# Patient Record
Sex: FEMALE | Race: BLACK OR AFRICAN AMERICAN
[De-identification: names, ages, dates, MRNs, and addresses within clinical notes are randomized per-mention and may not be internally consistent; named-entity substitution may affect disease eponyms.]

---

## 2017-09-18 ENCOUNTER — HOSPITAL ENCOUNTER (INPATIENT)
Dept: HOSPITAL 12 - ER | Age: 32
LOS: 2 days | Discharge: HOME | DRG: 314 | End: 2017-09-20
Attending: INTERNAL MEDICINE | Admitting: INTERNAL MEDICINE
Payer: COMMERCIAL

## 2017-09-18 VITALS — SYSTOLIC BLOOD PRESSURE: 164 MMHG | DIASTOLIC BLOOD PRESSURE: 104 MMHG

## 2017-09-18 VITALS — SYSTOLIC BLOOD PRESSURE: 151 MMHG | DIASTOLIC BLOOD PRESSURE: 88 MMHG

## 2017-09-18 VITALS — BODY MASS INDEX: 19.88 KG/M2 | WEIGHT: 108 LBS | HEIGHT: 62 IN

## 2017-09-18 DIAGNOSIS — I69.351: ICD-10-CM

## 2017-09-18 DIAGNOSIS — Z91.040: ICD-10-CM

## 2017-09-18 DIAGNOSIS — E28.2: ICD-10-CM

## 2017-09-18 DIAGNOSIS — Z88.8: ICD-10-CM

## 2017-09-18 DIAGNOSIS — E87.5: ICD-10-CM

## 2017-09-18 DIAGNOSIS — Z82.49: ICD-10-CM

## 2017-09-18 DIAGNOSIS — Z79.899: ICD-10-CM

## 2017-09-18 DIAGNOSIS — K58.9: ICD-10-CM

## 2017-09-18 DIAGNOSIS — G43.909: ICD-10-CM

## 2017-09-18 DIAGNOSIS — Z88.6: ICD-10-CM

## 2017-09-18 DIAGNOSIS — M32.14: ICD-10-CM

## 2017-09-18 DIAGNOSIS — N80.0: ICD-10-CM

## 2017-09-18 DIAGNOSIS — Z81.8: ICD-10-CM

## 2017-09-18 DIAGNOSIS — Z3A.00: ICD-10-CM

## 2017-09-18 DIAGNOSIS — O10.219: ICD-10-CM

## 2017-09-18 DIAGNOSIS — T82.41XA: Primary | ICD-10-CM

## 2017-09-18 DIAGNOSIS — F11.20: ICD-10-CM

## 2017-09-18 DIAGNOSIS — G89.4: ICD-10-CM

## 2017-09-18 DIAGNOSIS — Z91.15: ICD-10-CM

## 2017-09-18 DIAGNOSIS — G40.909: ICD-10-CM

## 2017-09-18 DIAGNOSIS — T82.838A: ICD-10-CM

## 2017-09-18 DIAGNOSIS — E87.79: ICD-10-CM

## 2017-09-18 DIAGNOSIS — Z99.2: ICD-10-CM

## 2017-09-18 DIAGNOSIS — M32.19: ICD-10-CM

## 2017-09-18 DIAGNOSIS — M79.7: ICD-10-CM

## 2017-09-18 DIAGNOSIS — D63.8: ICD-10-CM

## 2017-09-18 DIAGNOSIS — I12.0: ICD-10-CM

## 2017-09-18 DIAGNOSIS — N18.6: ICD-10-CM

## 2017-09-18 LAB
ALP SERPL-CCNC: 85 U/L (ref 50–136)
ALT SERPL W/O P-5'-P-CCNC: 30 U/L (ref 14–59)
AST SERPL-CCNC: 16 U/L (ref 15–37)
BASOPHILS # BLD AUTO: 0 K/UL (ref 0–8)
BASOPHILS NFR BLD AUTO: 0.7 % (ref 0–2)
BILIRUB DIRECT SERPL-MCNC: 0.2 MG/DL (ref 0–0.2)
BILIRUB SERPL-MCNC: 0.7 MG/DL (ref 0.2–1)
BUN SERPL-MCNC: 20 MG/DL (ref 7–18)
CHLORIDE SERPL-SCNC: 94 MMOL/L (ref 98–107)
CO2 SERPL-SCNC: 31 MMOL/L (ref 21–32)
CREAT SERPL-MCNC: 6.8 MG/DL (ref 0.6–1.3)
EOSINOPHIL # BLD AUTO: 0 K/UL (ref 0–0.7)
EOSINOPHIL NFR BLD AUTO: 1.3 % (ref 0–7)
GLUCOSE SERPL-MCNC: 96 MG/DL (ref 74–106)
HCT VFR BLD AUTO: 31.5 % (ref 37–47)
HGB BLD-MCNC: 10.1 G/DL (ref 12–16)
LIPASE SERPL-CCNC: 62 U/L (ref 73–393)
LYMPHOCYTES # BLD AUTO: 0.5 K/UL (ref 0.8–4.8)
LYMPHOCYTES NFR BLD AUTO: 15.4 % (ref 20.5–51.5)
MCH RBC QN AUTO: 30.6 UUG (ref 27–31)
MCHC RBC AUTO-ENTMCNC: 32 G/DL (ref 32–37)
MCV RBC AUTO: 95.8 FL (ref 81–99)
MONOCYTES # BLD AUTO: 0.2 K/UL (ref 0.1–1.3)
MONOCYTES NFR BLD AUTO: 5.6 % (ref 0–11)
NEUTROPHILS # BLD AUTO: 2.6 K/UL (ref 1.8–8.9)
NEUTROPHILS NFR BLD AUTO: 77 % (ref 38.5–71.5)
PLATELET # BLD AUTO: 134 K/UL (ref 150–450)
POTASSIUM SERPL-SCNC: 5.2 MMOL/L (ref 3.5–5.1)
RBC # BLD AUTO: 3.29 MIL/UL (ref 4.2–5.4)
WBC # BLD AUTO: 3.4 K/UL (ref 4–11.2)
WS STN SPEC: 8.6 G/DL (ref 6.4–8.2)

## 2017-09-18 PROCEDURE — 5A1D60Z: ICD-10-PCS | Performed by: INTERNAL MEDICINE

## 2017-09-18 PROCEDURE — A4663 DIALYSIS BLOOD PRESSURE CUFF: HCPCS

## 2017-09-18 NOTE — NUR
PT.WAS SEEN BY .PT. SEEMS TO HAVE SOME ANXIETY.NO S/S OF DISTRESS,HD 
SIDE

STILL UZING BLOOD,PRESSURE DRESSING REAPPLIED.

## 2017-09-18 NOTE — NUR
Telephone report given to annie Canales. vital signs stable. No bleeding noted at AV Shunt. 
patient AAOX4. able to use bedside commode.

## 2017-09-18 NOTE — NUR
PATIENT IN BED ALERT ORIENTED, NO COMPLAIN OF PAIN AT THIS TIME. NO FURTHER BLEEDING NOTED 
AT AV SHUNT ON L THIGH. CALL LIGHT WITHIN REACH. CONT TO MONITOR.

## 2017-09-18 NOTE — NUR
A call to Dr. Drummond report given and orders to transfer patient to telemetry status. vital 
signs stable. no signs of bleeding to left upper thigh.

## 2017-09-18 NOTE — NUR
Received this transfer from CCU, per wheelchair. Alert, oriented x 4, able to move all 
extremities on purpose. Left AV fistula with dressing, no bleeding noted. Saline to Right 
foot. Placed on tele SR 95.

## 2017-09-18 NOTE — NUR
At this time patient, in from ER. vital signs stable, no c/of pain. no n/v. left upper thigh 
with clamp in place no bleeding noted. Patient AAOx4 with vitals signs stable at this time. 

-------------------------------------------------------------------------------

Addendum: 09/18/17 at 1401 by YAN ROME RN

-------------------------------------------------------------------------------

Dr. Drummond called to be notified of pt's admission to ICU as [facility] overflow .

## 2017-09-19 VITALS — SYSTOLIC BLOOD PRESSURE: 146 MMHG | DIASTOLIC BLOOD PRESSURE: 88 MMHG

## 2017-09-19 VITALS — SYSTOLIC BLOOD PRESSURE: 146 MMHG | DIASTOLIC BLOOD PRESSURE: 95 MMHG

## 2017-09-19 VITALS — DIASTOLIC BLOOD PRESSURE: 107 MMHG | SYSTOLIC BLOOD PRESSURE: 156 MMHG

## 2017-09-19 VITALS — DIASTOLIC BLOOD PRESSURE: 106 MMHG | SYSTOLIC BLOOD PRESSURE: 146 MMHG

## 2017-09-19 VITALS — SYSTOLIC BLOOD PRESSURE: 165 MMHG | DIASTOLIC BLOOD PRESSURE: 106 MMHG

## 2017-09-19 LAB
ALP SERPL-CCNC: 76 U/L (ref 50–136)
ALT SERPL W/O P-5'-P-CCNC: 21 U/L (ref 14–59)
AST SERPL-CCNC: 14 U/L (ref 15–37)
BASOPHILS # BLD AUTO: 0 K/UL (ref 0–8)
BASOPHILS NFR BLD AUTO: 0.7 % (ref 0–2)
BILIRUB SERPL-MCNC: 0.5 MG/DL (ref 0.2–1)
BUN SERPL-MCNC: 29 MG/DL (ref 7–18)
CHLORIDE SERPL-SCNC: 94 MMOL/L (ref 98–107)
CHOLEST SERPL-MCNC: 132 MG/DL (ref ?–200)
CO2 SERPL-SCNC: 31 MMOL/L (ref 21–32)
CREAT SERPL-MCNC: 9.4 MG/DL (ref 0.6–1.3)
EOSINOPHIL # BLD AUTO: 0.1 K/UL (ref 0–0.7)
EOSINOPHIL NFR BLD AUTO: 2.1 % (ref 0–7)
GLUCOSE SERPL-MCNC: 74 MG/DL (ref 74–106)
HCT VFR BLD AUTO: 29 % (ref 37–47)
HDLC SERPL-MCNC: 46 MG/DL (ref 40–60)
HGB BLD-MCNC: 9.3 G/DL (ref 12–16)
LYMPHOCYTES # BLD AUTO: 1.1 K/UL (ref 0.8–4.8)
LYMPHOCYTES NFR BLD AUTO: 26.1 % (ref 20.5–51.5)
MAGNESIUM SERPL-MCNC: 2 MG/DL (ref 1.8–2.4)
MCH RBC QN AUTO: 31.4 UUG (ref 27–31)
MCHC RBC AUTO-ENTMCNC: 32 G/DL (ref 32–37)
MCV RBC AUTO: 97.1 FL (ref 81–99)
MONOCYTES # BLD AUTO: 0.3 K/UL (ref 0.1–1.3)
MONOCYTES NFR BLD AUTO: 7 % (ref 0–11)
NEUTROPHILS # BLD AUTO: 2.8 K/UL (ref 1.8–8.9)
NEUTROPHILS NFR BLD AUTO: 64.1 % (ref 38.5–71.5)
PHOSPHATE SERPL-MCNC: 7.1 MG/DL (ref 2.5–4.9)
PLATELET # BLD AUTO: 129 K/UL (ref 150–450)
POTASSIUM SERPL-SCNC: 5.4 MMOL/L (ref 3.5–5.1)
RBC # BLD AUTO: 2.96 MIL/UL (ref 4.2–5.4)
TRIGL SERPL-MCNC: 90 MG/DL (ref 30–150)
TSH SERPL DL<=0.005 MIU/L-ACNC: 3.02 MIU/ML (ref 0.36–3.74)
WBC # BLD AUTO: 4.3 K/UL (ref 4–11.2)
WS STN SPEC: 8.6 G/DL (ref 6.4–8.2)

## 2017-09-19 NOTE — NUR
PATIENT SEEN AND EXAMINED BY DR GRAHAM AND HE STATED THAT PATIENT WILL HAVE DIALYSIS TODAY 
AND HE IS AWARE OF THE CREATINE OF 9.4

## 2017-09-19 NOTE — NUR
RESTING COMFORTABLE REQUESTED AND RECEIVED MEDICATIONS FOR PAIN AND ITCHING AS ORDERED MADE 
COMFORTABLE.

## 2017-09-19 NOTE — NUR
PATIENT VOMITED ABOUT 150ML OF PARTLY UNDIGESTED FOOD UNABLE TO GIVE HER THE DUE HYDRALAZINE 
ZOFRAN GIVEN AS ORDERED AND WILL OBSERVE.

## 2017-09-19 NOTE — NUR
PATIENT BP ELEVATED 167/115 HR 97, DR. ARAGON ORDER METOPROLOL 50 MG PLUS TYLENOL 650MG 
PO, BUT PATIENT REFUSED BOTH MEDICATIONS. CONT TO MONITOR.

## 2017-09-19 NOTE — NUR
WHILE I WAS IN THE ROOM GIVING PATIENT HER BENADRYL AND DILAUDID THE DIALYSIS RN CAME IN 
MOVED THE COMPUTER AND THE BED TABLE AND PATIENT GOT UPSET AND TOLD HER THAT SHE WAS RUDE 
BECAUSE SHE WAS TALKING AND THAT SHE DID NOT NEED TO PUSH THINGS AROUND LIKE THAT AND 
FINALLY AFTER ALL SAID AND DONE THE PATIENT TOLD THE DIALYSIS RN THAT SHE DOES NOT WANT HER 
TO DO HER DIALYSIS SO THE DIALYSIS NURSE LEFT.

## 2017-09-20 VITALS — SYSTOLIC BLOOD PRESSURE: 121 MMHG | DIASTOLIC BLOOD PRESSURE: 80 MMHG

## 2017-09-20 VITALS — SYSTOLIC BLOOD PRESSURE: 117 MMHG | DIASTOLIC BLOOD PRESSURE: 84 MMHG

## 2017-09-20 VITALS — DIASTOLIC BLOOD PRESSURE: 90 MMHG | SYSTOLIC BLOOD PRESSURE: 152 MMHG

## 2017-09-20 NOTE — NUR
EAT LUNCH MOD AMT D/C INSTRUCTION GIVEN REGARDING F/U WITH VASCULAR MD /PMD CONTINUE HOME 
MEDICINE AS ORDER REFUSED PHAMACY TO INSTRUCTION ON HOME MEDICINE     AND EDUCATION PK GIVEN 
,UNDERSTAND AND SIGNS D/C SHEET

## 2017-09-20 NOTE — NUR
AWAKE ALERT COOPERATE WELL NO SOB OR PAIN EAT BREAKFAST WELL   HL INPLACE ON LT UPPER ARM 
AND CALL LIGHT WITHIN REACH

## 2017-09-20 NOTE — NUR
The patient will be discharged today back home [[id number], [place] [address]] 
per Dr. Falcon.  She is aware and in agreement.  She confirmed her address and will try 
to arrange for her transportation.  Her RN, Nigel, is aware of her discharge plan.

## 2017-09-20 NOTE — NUR
PATIENT SLEPT INTERMITTENTLY, IN NO ACUTE DISTRESS, NO SOB, NO C/O OF CHEST PAIN, NO S/S OF 
BLEEDING. PAIN MANAGEMENT AS ORDERED. PATIENT IS ON TELE SINUS RHYTHM. CALL LIGHT WITHIN 
REACH, WILL CONTINUE TO MONITOR.

## 2017-09-20 NOTE — NUR
IV HL D/C PRIOR D/C HOME TODAY CONDITION STABLE PAIN AND N/V UNDER CONTROL NO BLEEDING FROM 
AV SHUNT SITE DSG D/I

## 2017-10-11 ENCOUNTER — HOSPITAL ENCOUNTER (INPATIENT)
Dept: HOSPITAL 12 - ER | Age: 32
LOS: 2 days | Discharge: HOME | DRG: 682 | End: 2017-10-13
Attending: INTERNAL MEDICINE | Admitting: INTERNAL MEDICINE
Payer: COMMERCIAL

## 2017-10-11 VITALS — DIASTOLIC BLOOD PRESSURE: 120 MMHG | SYSTOLIC BLOOD PRESSURE: 197 MMHG

## 2017-10-11 VITALS — SYSTOLIC BLOOD PRESSURE: 143 MMHG | DIASTOLIC BLOOD PRESSURE: 88 MMHG

## 2017-10-11 VITALS — SYSTOLIC BLOOD PRESSURE: 149 MMHG | DIASTOLIC BLOOD PRESSURE: 106 MMHG

## 2017-10-11 VITALS — HEIGHT: 62 IN | BODY MASS INDEX: 18.95 KG/M2 | WEIGHT: 103 LBS

## 2017-10-11 DIAGNOSIS — Z91.040: ICD-10-CM

## 2017-10-11 DIAGNOSIS — I69.354: ICD-10-CM

## 2017-10-11 DIAGNOSIS — N18.6: ICD-10-CM

## 2017-10-11 DIAGNOSIS — R10.2: ICD-10-CM

## 2017-10-11 DIAGNOSIS — G89.4: ICD-10-CM

## 2017-10-11 DIAGNOSIS — G43.909: ICD-10-CM

## 2017-10-11 DIAGNOSIS — K21.9: ICD-10-CM

## 2017-10-11 DIAGNOSIS — N92.6: ICD-10-CM

## 2017-10-11 DIAGNOSIS — Z91.018: ICD-10-CM

## 2017-10-11 DIAGNOSIS — Z91.15: ICD-10-CM

## 2017-10-11 DIAGNOSIS — N93.8: ICD-10-CM

## 2017-10-11 DIAGNOSIS — K58.1: ICD-10-CM

## 2017-10-11 DIAGNOSIS — Z88.6: ICD-10-CM

## 2017-10-11 DIAGNOSIS — H26.9: ICD-10-CM

## 2017-10-11 DIAGNOSIS — Z79.899: ICD-10-CM

## 2017-10-11 DIAGNOSIS — M32.14: ICD-10-CM

## 2017-10-11 DIAGNOSIS — I12.0: Primary | ICD-10-CM

## 2017-10-11 DIAGNOSIS — D63.1: ICD-10-CM

## 2017-10-11 DIAGNOSIS — Z81.8: ICD-10-CM

## 2017-10-11 DIAGNOSIS — E87.5: ICD-10-CM

## 2017-10-11 DIAGNOSIS — G40.909: ICD-10-CM

## 2017-10-11 DIAGNOSIS — Z79.52: ICD-10-CM

## 2017-10-11 DIAGNOSIS — Z82.49: ICD-10-CM

## 2017-10-11 DIAGNOSIS — F12.90: ICD-10-CM

## 2017-10-11 DIAGNOSIS — M79.7: ICD-10-CM

## 2017-10-11 DIAGNOSIS — Z99.2: ICD-10-CM

## 2017-10-11 DIAGNOSIS — M14.80: ICD-10-CM

## 2017-10-11 LAB
ALP SERPL-CCNC: 72 U/L (ref 50–136)
ALT SERPL W/O P-5'-P-CCNC: 27 U/L (ref 14–59)
APPEARANCE UR: (no result)
AST SERPL-CCNC: 25 U/L (ref 15–37)
BASOPHILS # BLD AUTO: 0 K/UL (ref 0–8)
BASOPHILS NFR BLD AUTO: 0.9 % (ref 0–2)
BILIRUB DIRECT SERPL-MCNC: 0.1 MG/DL (ref 0–0.2)
BILIRUB SERPL-MCNC: 0.3 MG/DL (ref 0.2–1)
BILIRUB UR QL STRIP: NEGATIVE
BUN SERPL-MCNC: 88 MG/DL (ref 7–18)
CHLORIDE SERPL-SCNC: 104 MMOL/L (ref 98–107)
CO2 SERPL-SCNC: 19 MMOL/L (ref 21–32)
COLOR UR: (no result)
CREAT SERPL-MCNC: 16 MG/DL (ref 0.6–1.3)
DEPRECATED SQUAMOUS URNS QL MICRO: (no result) /HPF
EOSINOPHIL # BLD AUTO: 0.1 K/UL (ref 0–0.7)
EOSINOPHIL NFR BLD AUTO: 3.2 % (ref 0–7)
GLUCOSE SERPL-MCNC: 102 MG/DL (ref 74–106)
GLUCOSE UR STRIP-MCNC: (no result) MG/DL
HCT VFR BLD AUTO: 26 % (ref 31.2–41.9)
HGB BLD-MCNC: 8.6 G/DL (ref 10.9–14.3)
HGB UR QL STRIP: (no result)
KETONES UR STRIP-MCNC: NEGATIVE MG/DL
LEUKOCYTE ESTERASE UR QL STRIP: NEGATIVE
LYMPHOCYTES # BLD AUTO: 0.7 K/UL (ref 20–40)
LYMPHOCYTES NFR BLD AUTO: 19.8 % (ref 20.5–51.5)
MCH RBC QN AUTO: 32.5 UUG (ref 24.7–32.8)
MCHC RBC AUTO-ENTMCNC: 33 G/DL (ref 32.3–35.6)
MCV RBC AUTO: 98.8 FL (ref 75.5–95.3)
MONOCYTES # BLD AUTO: 0.3 K/UL (ref 2–10)
MONOCYTES NFR BLD AUTO: 7.1 % (ref 0–11)
NEUTROPHILS # BLD AUTO: 2.5 K/UL (ref 1.8–8.9)
NEUTROPHILS NFR BLD AUTO: 69 % (ref 38.5–71.5)
NITRITE UR QL STRIP: NEGATIVE
PH UR STRIP: 8.5 [PH] (ref 5–8)
PLATELET # BLD AUTO: 128 K/UL (ref 179–408)
POTASSIUM SERPL-SCNC: 8.7 MMOL/L (ref 3.5–5.1)
PROT UR QL STRIP: (no result)
RBC # BLD AUTO: 2.64 MIL/UL (ref 3.63–4.92)
RBC #/AREA URNS HPF: (no result) /HPF (ref 0–3)
SP GR UR STRIP: 1.01 (ref 1–1.03)
UROBILINOGEN UR STRIP-MCNC: 0.2 E.U./DL
WBC # BLD AUTO: 3.6 K/UL (ref 3.8–11.8)
WBC #/AREA URNS HPF: (no result) /HPF
WBC #/AREA URNS HPF: (no result) /HPF (ref 0–3)
WS STN SPEC: 7.6 G/DL (ref 6.4–8.2)

## 2017-10-11 PROCEDURE — 5A1D70Z PERFORMANCE OF URINARY FILTRATION, INTERMITTENT, LESS THAN 6 HOURS PER DAY: ICD-10-PCS

## 2017-10-11 PROCEDURE — A4663 DIALYSIS BLOOD PRESSURE CUFF: HCPCS

## 2017-10-11 RX ADMIN — BENAZEPRIL HYDROCHLORIDE SCH MG: 20 TABLET, FILM COATED ORAL at 15:42

## 2017-10-11 RX ADMIN — RENAGEL SCH MG: 800 TABLET ORAL at 15:41

## 2017-10-11 RX ADMIN — LEVETIRACETAM SCH MG: 500 TABLET, FILM COATED ORAL at 17:00

## 2017-10-11 RX ADMIN — CARVEDILOL SCH MG: 25 TABLET, FILM COATED ORAL at 15:35

## 2017-10-11 RX ADMIN — RENAGEL SCH MG: 800 TABLET ORAL at 18:00

## 2017-10-11 RX ADMIN — DIPHENHYDRAMINE HYDROCHLORIDE PRN MG: 50 INJECTION INTRAMUSCULAR; INTRAVENOUS at 21:06

## 2017-10-11 RX ADMIN — CARVEDILOL SCH MG: 25 TABLET, FILM COATED ORAL at 17:00

## 2017-10-11 RX ADMIN — DIPHENHYDRAMINE HYDROCHLORIDE PRN MG: 50 INJECTION INTRAMUSCULAR; INTRAVENOUS at 13:21

## 2017-10-11 RX ADMIN — LEVETIRACETAM SCH MG: 500 TABLET, FILM COATED ORAL at 15:34

## 2017-10-11 RX ADMIN — HYDROMORPHONE HYDROCHLORIDE PRN MG: 2 INJECTION, SOLUTION INTRAMUSCULAR; INTRAVENOUS; SUBCUTANEOUS at 21:06

## 2017-10-11 RX ADMIN — DOXAZOSIN MESYLATE SCH MG: 1 TABLET ORAL at 15:36

## 2017-10-11 RX ADMIN — AMLODIPINE BESYLATE SCH MG: 10 TABLET ORAL at 15:35

## 2017-10-11 RX ADMIN — HYDROMORPHONE HYDROCHLORIDE PRN MG: 2 INJECTION, SOLUTION INTRAMUSCULAR; INTRAVENOUS; SUBCUTANEOUS at 17:07

## 2017-10-11 RX ADMIN — HYDROMORPHONE HYDROCHLORIDE PRN MG: 2 INJECTION, SOLUTION INTRAMUSCULAR; INTRAVENOUS; SUBCUTANEOUS at 13:21

## 2017-10-11 RX ADMIN — DIPHENHYDRAMINE HYDROCHLORIDE PRN MG: 50 INJECTION INTRAMUSCULAR; INTRAVENOUS at 17:07

## 2017-10-11 RX ADMIN — DOCUSATE SODIUM SCH MG: 100 CAPSULE, LIQUID FILLED ORAL at 21:00

## 2017-10-11 RX ADMIN — CINACALCET HYDROCHLORIDE SCH MG: 30 TABLET, COATED ORAL at 15:35

## 2017-10-11 RX ADMIN — DOXAZOSIN MESYLATE SCH MG: 1 TABLET ORAL at 17:00

## 2017-10-11 RX ADMIN — BENAZEPRIL HYDROCHLORIDE SCH MG: 20 TABLET, FILM COATED ORAL at 17:00

## 2017-10-11 NOTE — NUR
dialysis completed, took out 2.5 liters of fluid, no distress noted, will serve lunch and 
give medications, tele SR 90's, needs attended

## 2017-10-11 NOTE — NUR
resting in bed, medicated x2 with Dilaudid 1.5mg iv prn for pain this shift, all needs 
attended and met, still has vaginal bleeding, changed peripad x 3 this shift, call light 
within reach

## 2017-10-11 NOTE — NUR
PT RECEIVED IN BED, AWAKE. A/OX4. ABLE TO MAKE NEEDS KNOWN. V/S STABLE. IN NO ACUTE 
DISTRESS. C/O ABDOMINAL PAIN AND HEADACHE 8/10. IV INTACT AND PATENT. AV SHUNT IN LEFT 
THIGH, THRILL FELT. SAFETY MEASURE IMPLEMENTED. CALL LIGHT WITHIN REACH.

## 2017-10-11 NOTE — NUR
received from ER awake alert and oriented per stretcher with c./o vaginal bleeding and lower 
abdominal and back pain, states missed her dialysis. av shunt on the left thigh, routine 
admission care rendered, initial assessment done, spoke to dialysis nurse- will do her 
today, safety measures initiated, saline lock on the left lower leg above the ankle. call 
light within reach.

## 2017-10-11 NOTE — NUR
dr. teran admitted the pt. transfer to floor pending on the bed being 
available. nsg supervisor aware.

## 2017-10-12 VITALS — DIASTOLIC BLOOD PRESSURE: 95 MMHG | SYSTOLIC BLOOD PRESSURE: 136 MMHG

## 2017-10-12 VITALS — SYSTOLIC BLOOD PRESSURE: 138 MMHG | DIASTOLIC BLOOD PRESSURE: 76 MMHG

## 2017-10-12 VITALS — DIASTOLIC BLOOD PRESSURE: 87 MMHG | SYSTOLIC BLOOD PRESSURE: 139 MMHG

## 2017-10-12 LAB
ALP SERPL-CCNC: 65 U/L (ref 50–136)
ALT SERPL W/O P-5'-P-CCNC: 19 U/L (ref 14–59)
AST SERPL-CCNC: 16 U/L (ref 15–37)
BASOPHILS # BLD AUTO: 0 K/UL (ref 0–8)
BASOPHILS NFR BLD AUTO: 0.9 % (ref 0–2)
BILIRUB SERPL-MCNC: 0.4 MG/DL (ref 0.2–1)
BUN SERPL-MCNC: 49 MG/DL (ref 7–18)
CHLORIDE SERPL-SCNC: 99 MMOL/L (ref 98–107)
CO2 SERPL-SCNC: 30 MMOL/L (ref 21–32)
CREAT SERPL-MCNC: 11.5 MG/DL (ref 0.6–1.3)
EOSINOPHIL # BLD AUTO: 0.2 K/UL (ref 0–0.7)
EOSINOPHIL NFR BLD AUTO: 4.3 % (ref 0–7)
EOSINOPHIL NFR BLD MANUAL: 6 % (ref 0–8)
GLUCOSE SERPL-MCNC: 92 MG/DL (ref 74–106)
HCT VFR BLD AUTO: 29.5 % (ref 31.2–41.9)
HGB BLD-MCNC: 9.6 G/DL (ref 10.9–14.3)
LYMPHOCYTES # BLD AUTO: 1 K/UL (ref 20–40)
LYMPHOCYTES NFR BLD AUTO: 29.5 % (ref 20.5–51.5)
LYMPHOCYTES NFR BLD MANUAL: 38 % (ref 20–40)
MAGNESIUM SERPL-MCNC: 2.2 MG/DL (ref 1.8–2.4)
MCH RBC QN AUTO: 31.9 UUG (ref 24.7–32.8)
MCHC RBC AUTO-ENTMCNC: 32 G/DL (ref 32.3–35.6)
MCV RBC AUTO: 98.3 FL (ref 75.5–95.3)
MONOCYTES # BLD AUTO: 0.3 K/UL (ref 2–10)
MONOCYTES NFR BLD AUTO: 9.1 % (ref 0–11)
MONOCYTES NFR BLD MANUAL: 6 % (ref 2–10)
NEUTROPHILS # BLD AUTO: 2 K/UL (ref 1.8–8.9)
NEUTROPHILS NFR BLD AUTO: 56.2 % (ref 38.5–71.5)
NEUTS SEG NFR BLD MANUAL: 50 % (ref 42–75)
PHOSPHATE SERPL-MCNC: 8.3 MG/DL (ref 2.5–4.9)
PLATELET # BLD AUTO: 61 K/UL (ref 179–408)
POTASSIUM SERPL-SCNC: 5.2 MMOL/L (ref 3.5–5.1)
RBC # BLD AUTO: 3 MIL/UL (ref 3.63–4.92)
WBC # BLD AUTO: 3.5 K/UL (ref 3.8–11.8)
WS STN SPEC: 8 G/DL (ref 6.4–8.2)

## 2017-10-12 RX ADMIN — HYDROMORPHONE HYDROCHLORIDE PRN MG: 2 INJECTION, SOLUTION INTRAMUSCULAR; INTRAVENOUS; SUBCUTANEOUS at 05:34

## 2017-10-12 RX ADMIN — HYDROMORPHONE HYDROCHLORIDE PRN MG: 2 INJECTION, SOLUTION INTRAMUSCULAR; INTRAVENOUS; SUBCUTANEOUS at 21:59

## 2017-10-12 RX ADMIN — RENAGEL SCH MG: 800 TABLET ORAL at 08:40

## 2017-10-12 RX ADMIN — HYDROMORPHONE HYDROCHLORIDE PRN MG: 2 INJECTION, SOLUTION INTRAMUSCULAR; INTRAVENOUS; SUBCUTANEOUS at 17:56

## 2017-10-12 RX ADMIN — CARVEDILOL SCH MG: 25 TABLET, FILM COATED ORAL at 16:59

## 2017-10-12 RX ADMIN — LEVETIRACETAM SCH MG: 500 TABLET, FILM COATED ORAL at 08:40

## 2017-10-12 RX ADMIN — HYDROMORPHONE HYDROCHLORIDE PRN MG: 2 INJECTION, SOLUTION INTRAMUSCULAR; INTRAVENOUS; SUBCUTANEOUS at 13:35

## 2017-10-12 RX ADMIN — DOXAZOSIN MESYLATE SCH MG: 1 TABLET ORAL at 17:00

## 2017-10-12 RX ADMIN — CARVEDILOL SCH MG: 25 TABLET, FILM COATED ORAL at 17:00

## 2017-10-12 RX ADMIN — CARVEDILOL SCH MG: 25 TABLET, FILM COATED ORAL at 08:41

## 2017-10-12 RX ADMIN — LEVETIRACETAM SCH MG: 500 TABLET, FILM COATED ORAL at 16:59

## 2017-10-12 RX ADMIN — DIPHENHYDRAMINE HYDROCHLORIDE PRN MG: 50 INJECTION INTRAMUSCULAR; INTRAVENOUS at 13:42

## 2017-10-12 RX ADMIN — AMLODIPINE BESYLATE SCH MG: 10 TABLET ORAL at 08:41

## 2017-10-12 RX ADMIN — RENAGEL SCH MG: 800 TABLET ORAL at 16:59

## 2017-10-12 RX ADMIN — HYDROMORPHONE HYDROCHLORIDE PRN MG: 2 INJECTION, SOLUTION INTRAMUSCULAR; INTRAVENOUS; SUBCUTANEOUS at 09:55

## 2017-10-12 RX ADMIN — DIPHENHYDRAMINE HYDROCHLORIDE PRN MG: 50 INJECTION INTRAMUSCULAR; INTRAVENOUS at 05:34

## 2017-10-12 RX ADMIN — BENAZEPRIL HYDROCHLORIDE SCH MG: 20 TABLET, FILM COATED ORAL at 08:41

## 2017-10-12 RX ADMIN — BENAZEPRIL HYDROCHLORIDE SCH MG: 20 TABLET, FILM COATED ORAL at 16:59

## 2017-10-12 RX ADMIN — CINACALCET HYDROCHLORIDE SCH MG: 30 TABLET, COATED ORAL at 08:40

## 2017-10-12 RX ADMIN — FAMOTIDINE SCH MG: 10 INJECTION INTRAVENOUS at 08:40

## 2017-10-12 RX ADMIN — RENAGEL SCH MG: 800 TABLET ORAL at 12:41

## 2017-10-12 RX ADMIN — DIPHENHYDRAMINE HYDROCHLORIDE PRN MG: 50 INJECTION INTRAMUSCULAR; INTRAVENOUS at 09:54

## 2017-10-12 RX ADMIN — DOCUSATE SODIUM SCH MG: 100 CAPSULE, LIQUID FILLED ORAL at 21:00

## 2017-10-12 RX ADMIN — DIPHENHYDRAMINE HYDROCHLORIDE PRN MG: 50 INJECTION INTRAMUSCULAR; INTRAVENOUS at 21:59

## 2017-10-12 RX ADMIN — HYDROMORPHONE HYDROCHLORIDE PRN MG: 2 INJECTION, SOLUTION INTRAMUSCULAR; INTRAVENOUS; SUBCUTANEOUS at 01:07

## 2017-10-12 RX ADMIN — BENAZEPRIL HYDROCHLORIDE SCH MG: 20 TABLET, FILM COATED ORAL at 17:00

## 2017-10-12 RX ADMIN — DOXAZOSIN MESYLATE SCH MG: 1 TABLET ORAL at 08:41

## 2017-10-12 RX ADMIN — Medication SCH TAB: at 08:40

## 2017-10-12 RX ADMIN — DIPHENHYDRAMINE HYDROCHLORIDE PRN MG: 50 INJECTION INTRAMUSCULAR; INTRAVENOUS at 01:07

## 2017-10-12 RX ADMIN — DIPHENHYDRAMINE HYDROCHLORIDE PRN MG: 50 INJECTION INTRAMUSCULAR; INTRAVENOUS at 17:56

## 2017-10-12 NOTE — NUR
END OF SHIFT NOTES. PT SLEPT INTERMITTENTLY THROUGHOUT SHIFT. IN STABLE CONDITION. PAIN 
MANAGED. IV/INTACT PATENT. ALL NEEDS ATTENDED. SAFETY MAINTAINED. CALL LIGHT WITHIN REACH.

## 2017-10-12 NOTE — NUR
PT'S ON BED REST AFTER SHE GOT MEDICATION VIA IV SITE ON LEFT FOOT,PT STATED THAT"IT'S 
HURTING ME A LOT EVEN THOUGH MY VEIN'S STILL GOOD ONE.I DON'T LIKE IT ON MY FOOT",RESTARTED 
THE NEW HL AT LEFT FOREARM AND REMOVED THE OLD ONE FROM LEFT FOOT:PER PT REQUESTED.UPDATED 
THE PLAN OF CARE TO PT.PER PT STATED THAT" I HAVE PERIOD,STILL A LITTLE NOT REALLY A LOT 
LIKE BEFORE".KEPT COMFORT.CALL-LIGHT WITHIN REACH.

## 2017-10-13 VITALS — DIASTOLIC BLOOD PRESSURE: 89 MMHG | SYSTOLIC BLOOD PRESSURE: 132 MMHG

## 2017-10-13 VITALS — DIASTOLIC BLOOD PRESSURE: 92 MMHG | SYSTOLIC BLOOD PRESSURE: 147 MMHG

## 2017-10-13 VITALS — SYSTOLIC BLOOD PRESSURE: 124 MMHG | DIASTOLIC BLOOD PRESSURE: 83 MMHG

## 2017-10-13 VITALS — DIASTOLIC BLOOD PRESSURE: 92 MMHG | SYSTOLIC BLOOD PRESSURE: 150 MMHG

## 2017-10-13 RX ADMIN — CARVEDILOL SCH MG: 25 TABLET, FILM COATED ORAL at 09:14

## 2017-10-13 RX ADMIN — RENAGEL SCH MG: 800 TABLET ORAL at 17:16

## 2017-10-13 RX ADMIN — AMLODIPINE BESYLATE SCH MG: 10 TABLET ORAL at 09:13

## 2017-10-13 RX ADMIN — HYDROMORPHONE HYDROCHLORIDE PRN MG: 2 INJECTION, SOLUTION INTRAMUSCULAR; INTRAVENOUS; SUBCUTANEOUS at 14:23

## 2017-10-13 RX ADMIN — DIPHENHYDRAMINE HYDROCHLORIDE PRN MG: 50 INJECTION INTRAMUSCULAR; INTRAVENOUS at 06:01

## 2017-10-13 RX ADMIN — DOXAZOSIN MESYLATE SCH MG: 1 TABLET ORAL at 17:20

## 2017-10-13 RX ADMIN — Medication SCH TAB: at 09:13

## 2017-10-13 RX ADMIN — LEVETIRACETAM SCH MG: 500 TABLET, FILM COATED ORAL at 09:15

## 2017-10-13 RX ADMIN — DIPHENHYDRAMINE HYDROCHLORIDE PRN MG: 50 INJECTION INTRAMUSCULAR; INTRAVENOUS at 10:22

## 2017-10-13 RX ADMIN — BENAZEPRIL HYDROCHLORIDE SCH MG: 20 TABLET, FILM COATED ORAL at 17:20

## 2017-10-13 RX ADMIN — DIPHENHYDRAMINE HYDROCHLORIDE PRN MG: 50 INJECTION INTRAMUSCULAR; INTRAVENOUS at 02:07

## 2017-10-13 RX ADMIN — RENAGEL SCH MG: 800 TABLET ORAL at 12:22

## 2017-10-13 RX ADMIN — RENAGEL SCH MG: 800 TABLET ORAL at 09:13

## 2017-10-13 RX ADMIN — HYDROMORPHONE HYDROCHLORIDE PRN MG: 2 INJECTION, SOLUTION INTRAMUSCULAR; INTRAVENOUS; SUBCUTANEOUS at 06:01

## 2017-10-13 RX ADMIN — CINACALCET HYDROCHLORIDE SCH MG: 30 TABLET, COATED ORAL at 09:15

## 2017-10-13 RX ADMIN — CARVEDILOL SCH MG: 25 TABLET, FILM COATED ORAL at 17:20

## 2017-10-13 RX ADMIN — DIPHENHYDRAMINE HYDROCHLORIDE PRN MG: 50 INJECTION INTRAMUSCULAR; INTRAVENOUS at 18:55

## 2017-10-13 RX ADMIN — LEVETIRACETAM SCH MG: 500 TABLET, FILM COATED ORAL at 17:16

## 2017-10-13 RX ADMIN — HYDROMORPHONE HYDROCHLORIDE PRN MG: 2 INJECTION, SOLUTION INTRAMUSCULAR; INTRAVENOUS; SUBCUTANEOUS at 18:55

## 2017-10-13 RX ADMIN — FAMOTIDINE SCH MG: 10 INJECTION INTRAVENOUS at 07:49

## 2017-10-13 RX ADMIN — BENAZEPRIL HYDROCHLORIDE SCH MG: 20 TABLET, FILM COATED ORAL at 09:14

## 2017-10-13 RX ADMIN — DOXAZOSIN MESYLATE SCH MG: 1 TABLET ORAL at 09:14

## 2017-10-13 RX ADMIN — DIPHENHYDRAMINE HYDROCHLORIDE PRN MG: 50 INJECTION INTRAMUSCULAR; INTRAVENOUS at 14:22

## 2017-10-13 RX ADMIN — HYDROMORPHONE HYDROCHLORIDE PRN MG: 2 INJECTION, SOLUTION INTRAMUSCULAR; INTRAVENOUS; SUBCUTANEOUS at 02:08

## 2017-10-13 RX ADMIN — HYDROMORPHONE HYDROCHLORIDE PRN MG: 2 INJECTION, SOLUTION INTRAMUSCULAR; INTRAVENOUS; SUBCUTANEOUS at 10:23

## 2017-10-13 NOTE — NUR
D/C INSTRUCTION REGARDING F/U WITH OWN PMD CONTINUE HOME MEDICINE AND EDUCATION PK GIVE 
,VERBALIZES UNDERSTAND AND SIGNS  D/C SHEET HL D/C PRIOR D/C HOME TODAY

## 2017-10-13 NOTE — NUR
STABLE   CONDITION  NO ACUTE DISTRESS PAIN UNDER CONTROL  SAFETY MEASURE PROVIDED CALL 
LIGHT IN REACH

## 2017-10-13 NOTE — NUR
AWAKE COOPERATE NO SOB     STATE PAIN MED HELP TO RELIEF PAIN C/O NAUSDA MED PRN GIVEN     
VAGINAL BLEEDING WAS SLOW DOWN AND LESS  RESTING WELL WITH CALL LIGHT IN REACH

## 2017-10-13 NOTE — NUR
PT'S COMFORTABLE ON BED;DENIED OF PAIN OR ANY DISCOMFORT AT THIS TIME.PAIN'S CONTROLLED IN 
THE SHIFT NOTED.PER PT STATED THAT"I THINK I HAVE NO MORE PERIOD".NO DISTRESS NOTED IN THE 
SHIFT.

## 2017-10-18 ENCOUNTER — HOSPITAL ENCOUNTER (EMERGENCY)
Dept: HOSPITAL 12 - ER | Age: 32
Discharge: HOME | End: 2017-10-18
Payer: COMMERCIAL

## 2017-10-18 VITALS — HEIGHT: 62 IN | WEIGHT: 109 LBS | BODY MASS INDEX: 20.06 KG/M2

## 2017-10-18 VITALS — DIASTOLIC BLOOD PRESSURE: 97 MMHG | SYSTOLIC BLOOD PRESSURE: 148 MMHG

## 2017-10-18 DIAGNOSIS — K58.9: ICD-10-CM

## 2017-10-18 DIAGNOSIS — E28.2: ICD-10-CM

## 2017-10-18 DIAGNOSIS — M32.14: ICD-10-CM

## 2017-10-18 DIAGNOSIS — Z99.2: ICD-10-CM

## 2017-10-18 DIAGNOSIS — I12.0: Primary | ICD-10-CM

## 2017-10-18 DIAGNOSIS — N18.6: ICD-10-CM

## 2017-10-18 DIAGNOSIS — G89.4: ICD-10-CM

## 2017-10-18 DIAGNOSIS — M79.7: ICD-10-CM

## 2017-10-18 DIAGNOSIS — K21.9: ICD-10-CM

## 2017-10-18 DIAGNOSIS — G40.909: ICD-10-CM

## 2017-10-18 DIAGNOSIS — Z86.73: ICD-10-CM

## 2017-10-18 LAB
ALP SERPL-CCNC: 74 U/L (ref 50–136)
ALT SERPL W/O P-5'-P-CCNC: 10 U/L (ref 14–59)
APPEARANCE UR: (no result)
AST SERPL-CCNC: < 5 U/L (ref 15–37)
BASOPHILS # BLD AUTO: 0 K/UL (ref 0–8)
BASOPHILS NFR BLD AUTO: 0.7 % (ref 0–2)
BILIRUB DIRECT SERPL-MCNC: 0.1 MG/DL (ref 0–0.2)
BILIRUB SERPL-MCNC: 0.3 MG/DL (ref 0.2–1)
BILIRUB UR QL STRIP: NEGATIVE
BUN SERPL-MCNC: 50 MG/DL (ref 7–18)
CHLORIDE SERPL-SCNC: 103 MMOL/L (ref 98–107)
CO2 SERPL-SCNC: 27 MMOL/L (ref 21–32)
COLOR UR: YELLOW
CREAT SERPL-MCNC: 11.4 MG/DL (ref 0.6–1.3)
DEPRECATED SQUAMOUS URNS QL MICRO: (no result) /HPF
EOSINOPHIL # BLD AUTO: 0.2 K/UL (ref 0–0.7)
EOSINOPHIL NFR BLD AUTO: 4.9 % (ref 0–7)
GLUCOSE SERPL-MCNC: 94 MG/DL (ref 74–106)
GLUCOSE UR STRIP-MCNC: NEGATIVE MG/DL
HCG UR QL: NEGATIVE
HCT VFR BLD AUTO: 27 % (ref 37–47)
HGB BLD-MCNC: 8.4 G/DL (ref 12–16)
HGB UR QL STRIP: (no result)
KETONES UR STRIP-MCNC: NEGATIVE MG/DL
LEUKOCYTE ESTERASE UR QL STRIP: NEGATIVE
LIPASE SERPL-CCNC: 194 U/L (ref 73–393)
LYMPHOCYTES # BLD AUTO: 0.8 K/UL (ref 0.8–4.8)
LYMPHOCYTES NFR BLD AUTO: 19.3 % (ref 20.5–51.5)
MCH RBC QN AUTO: 30.4 UUG (ref 27–31)
MCHC RBC AUTO-ENTMCNC: 31 G/DL (ref 32–37)
MCV RBC AUTO: 97.3 FL (ref 81–99)
MONOCYTES # BLD AUTO: 0.3 K/UL (ref 0.1–1.3)
MONOCYTES NFR BLD AUTO: 8.1 % (ref 0–11)
NEUTROPHILS # BLD AUTO: 2.8 K/UL (ref 1.8–8.9)
NEUTROPHILS NFR BLD AUTO: 67 % (ref 38.5–71.5)
NITRITE UR QL STRIP: NEGATIVE
PH UR STRIP: 8.5 [PH] (ref 5–8)
PLATELET # BLD AUTO: 120 K/UL (ref 150–450)
POTASSIUM SERPL-SCNC: 5.8 MMOL/L (ref 3.5–5.1)
PROT UR QL STRIP: (no result)
RBC # BLD AUTO: 2.77 MIL/UL (ref 4.2–5.4)
RBC #/AREA URNS HPF: (no result) /HPF (ref 0–3)
SP GR UR STRIP: 1.02 (ref 1–1.03)
UROBILINOGEN UR STRIP-MCNC: 0.2 E.U./DL
WBC # BLD AUTO: 4.1 K/UL (ref 4–11.2)
WBC #/AREA URNS HPF: (no result) /HPF
WBC #/AREA URNS HPF: (no result) /HPF (ref 0–3)
WS STN SPEC: 7.4 G/DL (ref 6.4–8.2)

## 2017-10-18 PROCEDURE — A4663 DIALYSIS BLOOD PRESSURE CUFF: HCPCS

## 2017-10-18 NOTE — NUR
PT WAS RE-EVALUATED BY DR LYN. PT WAS D/C TO HOME. D/C INSTRUCTUIONS GIVEN 
TO THE PT. PT DENIES PAIN. NO N/V. NO SOB.

## 2017-10-18 NOTE — NUR
Patient observed in room talking on her cell phone, education provided 
regarding cell phone use in hospital but patient refuses to acknowledge staff 
or allow staff to perform care at this time.  ERMD notified, will perform care 
when patient finishes telephone call.

## 2018-01-08 ENCOUNTER — HOSPITAL ENCOUNTER (EMERGENCY)
Dept: HOSPITAL 12 - ER | Age: 33
Discharge: HOME | End: 2018-01-08
Payer: MEDICAID

## 2018-01-08 VITALS — BODY MASS INDEX: 20.06 KG/M2 | HEIGHT: 62 IN | WEIGHT: 109 LBS

## 2018-01-08 VITALS — SYSTOLIC BLOOD PRESSURE: 131 MMHG | DIASTOLIC BLOOD PRESSURE: 93 MMHG

## 2018-01-08 DIAGNOSIS — E11.22: ICD-10-CM

## 2018-01-08 DIAGNOSIS — G40.909: ICD-10-CM

## 2018-01-08 DIAGNOSIS — N18.6: ICD-10-CM

## 2018-01-08 DIAGNOSIS — G43.909: ICD-10-CM

## 2018-01-08 DIAGNOSIS — Z88.8: ICD-10-CM

## 2018-01-08 DIAGNOSIS — F11.20: ICD-10-CM

## 2018-01-08 DIAGNOSIS — K58.9: ICD-10-CM

## 2018-01-08 DIAGNOSIS — K21.9: ICD-10-CM

## 2018-01-08 DIAGNOSIS — E28.2: ICD-10-CM

## 2018-01-08 DIAGNOSIS — G89.29: ICD-10-CM

## 2018-01-08 DIAGNOSIS — M79.7: ICD-10-CM

## 2018-01-08 DIAGNOSIS — I12.0: Primary | ICD-10-CM

## 2018-01-08 DIAGNOSIS — Z99.2: ICD-10-CM

## 2018-01-08 LAB
BASOPHILS # BLD AUTO: 0 K/UL (ref 0–8)
BASOPHILS NFR BLD AUTO: 0.5 % (ref 0–2)
BUN SERPL-MCNC: 28 MG/DL (ref 7–18)
CHLORIDE SERPL-SCNC: 98 MMOL/L (ref 98–107)
CO2 SERPL-SCNC: 29 MMOL/L (ref 21–32)
CREAT SERPL-MCNC: 7.8 MG/DL (ref 0.6–1.3)
EOSINOPHIL # BLD AUTO: 0.1 K/UL (ref 0–0.7)
EOSINOPHIL NFR BLD AUTO: 1.2 % (ref 0–7)
GLUCOSE SERPL-MCNC: 87 MG/DL (ref 74–106)
HCT VFR BLD AUTO: 29.5 % (ref 31.2–41.9)
HGB BLD-MCNC: 9.6 G/DL (ref 10.9–14.3)
LYMPHOCYTES # BLD AUTO: 0.7 K/UL (ref 20–40)
LYMPHOCYTES NFR BLD AUTO: 8.7 % (ref 20.5–51.5)
MCH RBC QN AUTO: 30.8 UUG (ref 24.7–32.8)
MCHC RBC AUTO-ENTMCNC: 33 G/DL (ref 32.3–35.6)
MCV RBC AUTO: 94.5 FL (ref 75.5–95.3)
MONOCYTES # BLD AUTO: 0.6 K/UL (ref 2–10)
MONOCYTES NFR BLD AUTO: 8.6 % (ref 0–11)
NEUTROPHILS # BLD AUTO: 6.2 K/UL (ref 1.8–8.9)
NEUTROPHILS NFR BLD AUTO: 81 % (ref 38.5–71.5)
PLATELET # BLD AUTO: 168 K/UL (ref 179–408)
POTASSIUM SERPL-SCNC: 4.6 MMOL/L (ref 3.5–5.1)
RBC # BLD AUTO: 3.12 MIL/UL (ref 3.63–4.92)
WBC # BLD AUTO: 7.6 K/UL (ref 3.8–11.8)

## 2018-01-08 PROCEDURE — A4663 DIALYSIS BLOOD PRESSURE CUFF: HCPCS

## 2018-01-08 NOTE — NUR
Patient discharged to home in stable conditon.  Written and verbal after care 
instructions given. 

Patient verbalizes understanding of instructions.pt walks in steady gait, no 
sign of distress.

## 2018-02-19 ENCOUNTER — HOSPITAL ENCOUNTER (INPATIENT)
Dept: HOSPITAL 12 - ER | Age: 33
LOS: 4 days | Discharge: HOME | DRG: 314 | End: 2018-02-23
Payer: COMMERCIAL

## 2018-02-19 VITALS — SYSTOLIC BLOOD PRESSURE: 140 MMHG | DIASTOLIC BLOOD PRESSURE: 83 MMHG

## 2018-02-19 VITALS — HEIGHT: 62 IN | BODY MASS INDEX: 20.43 KG/M2 | WEIGHT: 111 LBS

## 2018-02-19 VITALS — SYSTOLIC BLOOD PRESSURE: 128 MMHG | DIASTOLIC BLOOD PRESSURE: 67 MMHG

## 2018-02-19 DIAGNOSIS — F11.20: ICD-10-CM

## 2018-02-19 DIAGNOSIS — Z82.49: ICD-10-CM
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
## 2018-02-19 DIAGNOSIS — T82.858A: Primary | ICD-10-CM

## 2018-02-19 DIAGNOSIS — E11.22: ICD-10-CM

## 2018-02-19 DIAGNOSIS — D69.6: ICD-10-CM

## 2018-02-19 DIAGNOSIS — Z79.899: ICD-10-CM

## 2018-02-19 DIAGNOSIS — K58.9: ICD-10-CM

## 2018-02-19 DIAGNOSIS — G40.909: ICD-10-CM

## 2018-02-19 DIAGNOSIS — Z99.2: ICD-10-CM

## 2018-02-19 DIAGNOSIS — N18.6: ICD-10-CM

## 2018-02-19 DIAGNOSIS — G43.909: ICD-10-CM

## 2018-02-19 DIAGNOSIS — N25.81: ICD-10-CM

## 2018-02-19 DIAGNOSIS — I12.0: ICD-10-CM

## 2018-02-19 DIAGNOSIS — Z79.01: ICD-10-CM

## 2018-02-19 DIAGNOSIS — Z86.73: ICD-10-CM

## 2018-02-19 DIAGNOSIS — M32.9: ICD-10-CM

## 2018-02-19 DIAGNOSIS — M79.652: ICD-10-CM

## 2018-02-19 DIAGNOSIS — K21.9: ICD-10-CM

## 2018-02-19 DIAGNOSIS — Z86.718: ICD-10-CM

## 2018-02-19 DIAGNOSIS — E87.5: ICD-10-CM

## 2018-02-19 DIAGNOSIS — G89.29: ICD-10-CM

## 2018-02-19 DIAGNOSIS — T82.868D: ICD-10-CM

## 2018-02-19 LAB
ALP SERPL-CCNC: 98 U/L (ref 50–136)
ALT SERPL W/O P-5'-P-CCNC: < 6 U/L (ref 14–59)
AST SERPL-CCNC: 14 U/L (ref 15–37)
BASOPHILS # BLD AUTO: 0 K/UL (ref 0–8)
BASOPHILS NFR BLD AUTO: 1.3 % (ref 0–2)
BILIRUB DIRECT SERPL-MCNC: 0.1 MG/DL (ref 0–0.2)
BILIRUB SERPL-MCNC: 0.3 MG/DL (ref 0.2–1)
BUN SERPL-MCNC: 36 MG/DL (ref 7–18)
CHLORIDE SERPL-SCNC: 98 MMOL/L (ref 98–107)
CO2 SERPL-SCNC: 31 MMOL/L (ref 21–32)
CREAT SERPL-MCNC: 9.3 MG/DL (ref 0.6–1.3)
EOSINOPHIL # BLD AUTO: 0.1 K/UL (ref 0–0.7)
EOSINOPHIL NFR BLD AUTO: 3.8 % (ref 0–7)
GLUCOSE SERPL-MCNC: 88 MG/DL (ref 74–106)
HCT VFR BLD AUTO: 29.9 % (ref 31.2–41.9)
HGB BLD-MCNC: 9.6 G/DL (ref 10.9–14.3)
LYMPHOCYTES # BLD AUTO: 0.7 K/UL (ref 20–40)
LYMPHOCYTES NFR BLD AUTO: 19.5 % (ref 20.5–51.5)
MCH RBC QN AUTO: 31.4 UUG (ref 24.7–32.8)
MCHC RBC AUTO-ENTMCNC: 32 G/DL (ref 32.3–35.6)
MCV RBC AUTO: 98.1 FL (ref 75.5–95.3)
MONOCYTES # BLD AUTO: 0.2 K/UL (ref 2–10)
MONOCYTES NFR BLD AUTO: 6.7 % (ref 0–11)
NEUTROPHILS # BLD AUTO: 2.4 K/UL (ref 1.8–8.9)
NEUTROPHILS NFR BLD AUTO: 68.7 % (ref 38.5–71.5)
PLATELET # BLD AUTO: 135 K/UL (ref 179–408)
POTASSIUM SERPL-SCNC: 5.3 MMOL/L (ref 3.5–5.1)
RBC # BLD AUTO: 3.05 MIL/UL (ref 3.63–4.92)
WBC # BLD AUTO: 3.5 K/UL (ref 3.8–11.8)
WS STN SPEC: 8.2 G/DL (ref 6.4–8.2)

## 2018-02-19 PROCEDURE — A4663 DIALYSIS BLOOD PRESSURE CUFF: HCPCS

## 2018-02-19 PROCEDURE — 05H533Z INSERTION OF INFUSION DEVICE INTO RIGHT SUBCLAVIAN VEIN, PERCUTANEOUS APPROACH: ICD-10-PCS

## 2018-02-19 PROCEDURE — 5A1D70Z PERFORMANCE OF URINARY FILTRATION, INTERMITTENT, LESS THAN 6 HOURS PER DAY: ICD-10-PCS | Performed by: INTERNAL MEDICINE

## 2018-02-19 RX ADMIN — CARVEDILOL SCH MG: 25 TABLET, FILM COATED ORAL at 20:56

## 2018-02-19 RX ADMIN — LEVETIRACETAM SCH MG: 500 TABLET, FILM COATED ORAL at 20:48

## 2018-02-19 RX ADMIN — APIXABAN SCH MG: 5 TABLET, FILM COATED ORAL at 21:11

## 2018-02-19 RX ADMIN — DOXAZOSIN MESYLATE SCH MG: 1 TABLET ORAL at 20:55

## 2018-02-19 RX ADMIN — BENAZEPRIL HYDROCHLORIDE SCH MG: 20 TABLET, FILM COATED ORAL at 20:56

## 2018-02-19 RX ADMIN — RENAGEL SCH MG: 800 TABLET ORAL at 20:48

## 2018-02-20 VITALS — DIASTOLIC BLOOD PRESSURE: 71 MMHG | SYSTOLIC BLOOD PRESSURE: 114 MMHG

## 2018-02-20 VITALS — DIASTOLIC BLOOD PRESSURE: 94 MMHG | SYSTOLIC BLOOD PRESSURE: 148 MMHG

## 2018-02-20 VITALS — DIASTOLIC BLOOD PRESSURE: 99 MMHG | SYSTOLIC BLOOD PRESSURE: 150 MMHG

## 2018-02-20 VITALS — SYSTOLIC BLOOD PRESSURE: 124 MMHG | DIASTOLIC BLOOD PRESSURE: 85 MMHG

## 2018-02-20 VITALS — DIASTOLIC BLOOD PRESSURE: 81 MMHG | SYSTOLIC BLOOD PRESSURE: 125 MMHG

## 2018-02-20 VITALS — DIASTOLIC BLOOD PRESSURE: 111 MMHG | SYSTOLIC BLOOD PRESSURE: 146 MMHG

## 2018-02-20 VITALS — SYSTOLIC BLOOD PRESSURE: 141 MMHG | DIASTOLIC BLOOD PRESSURE: 93 MMHG

## 2018-02-20 VITALS — DIASTOLIC BLOOD PRESSURE: 109 MMHG | SYSTOLIC BLOOD PRESSURE: 164 MMHG

## 2018-02-20 VITALS — SYSTOLIC BLOOD PRESSURE: 135 MMHG | DIASTOLIC BLOOD PRESSURE: 90 MMHG

## 2018-02-20 VITALS — SYSTOLIC BLOOD PRESSURE: 148 MMHG | DIASTOLIC BLOOD PRESSURE: 94 MMHG

## 2018-02-20 LAB — HCG UR QL: NEGATIVE

## 2018-02-20 RX ADMIN — HYDROMORPHONE HYDROCHLORIDE PRN MG: 2 INJECTION, SOLUTION INTRAMUSCULAR; INTRAVENOUS; SUBCUTANEOUS at 06:18

## 2018-02-20 RX ADMIN — AMLODIPINE BESYLATE SCH MG: 10 TABLET ORAL at 08:57

## 2018-02-20 RX ADMIN — CINACALCET HYDROCHLORIDE SCH MG: 30 TABLET, COATED ORAL at 08:56

## 2018-02-20 RX ADMIN — SODIUM CHLORIDE PRN MG: 9 INJECTION, SOLUTION INTRAVENOUS at 12:32

## 2018-02-20 RX ADMIN — APIXABAN SCH MG: 5 TABLET, FILM COATED ORAL at 09:26

## 2018-02-20 RX ADMIN — HYDROMORPHONE HYDROCHLORIDE PRN MG: 2 INJECTION, SOLUTION INTRAMUSCULAR; INTRAVENOUS; SUBCUTANEOUS at 00:36

## 2018-02-20 RX ADMIN — DOXAZOSIN MESYLATE SCH MG: 1 TABLET ORAL at 17:00

## 2018-02-20 RX ADMIN — PANTOPRAZOLE SODIUM SCH MG: 40 TABLET, DELAYED RELEASE ORAL at 17:36

## 2018-02-20 RX ADMIN — HYDROMORPHONE HYDROCHLORIDE PRN MG: 2 INJECTION, SOLUTION INTRAMUSCULAR; INTRAVENOUS; SUBCUTANEOUS at 23:20

## 2018-02-20 RX ADMIN — RENAGEL SCH MG: 800 TABLET ORAL at 17:35

## 2018-02-20 RX ADMIN — RENAGEL SCH MG: 800 TABLET ORAL at 08:26

## 2018-02-20 RX ADMIN — BENAZEPRIL HYDROCHLORIDE SCH MG: 20 TABLET, FILM COATED ORAL at 17:00

## 2018-02-20 RX ADMIN — LEVETIRACETAM SCH MG: 500 TABLET, FILM COATED ORAL at 17:36

## 2018-02-20 RX ADMIN — RENAGEL SCH MG: 800 TABLET ORAL at 12:27

## 2018-02-20 RX ADMIN — LEVETIRACETAM SCH MG: 500 TABLET, FILM COATED ORAL at 08:56

## 2018-02-20 RX ADMIN — CARVEDILOL SCH MG: 25 TABLET, FILM COATED ORAL at 09:26

## 2018-02-20 RX ADMIN — CARVEDILOL SCH MG: 25 TABLET, FILM COATED ORAL at 17:00

## 2018-02-20 RX ADMIN — Medication SCH TAB: at 08:57

## 2018-02-20 RX ADMIN — HYDROMORPHONE HYDROCHLORIDE PRN MG: 2 INJECTION, SOLUTION INTRAMUSCULAR; INTRAVENOUS; SUBCUTANEOUS at 17:37

## 2018-02-20 RX ADMIN — BENAZEPRIL HYDROCHLORIDE SCH MG: 20 TABLET, FILM COATED ORAL at 08:57

## 2018-02-20 RX ADMIN — DOXAZOSIN MESYLATE SCH MG: 1 TABLET ORAL at 08:56

## 2018-02-20 RX ADMIN — APIXABAN SCH MG: 5 TABLET, FILM COATED ORAL at 17:41

## 2018-02-20 RX ADMIN — PANTOPRAZOLE SODIUM SCH MG: 40 TABLET, DELAYED RELEASE ORAL at 06:03

## 2018-02-20 RX ADMIN — HYDROMORPHONE HYDROCHLORIDE PRN MG: 2 INJECTION, SOLUTION INTRAMUSCULAR; INTRAVENOUS; SUBCUTANEOUS at 12:00

## 2018-02-20 RX ADMIN — HYDROXYCHLOROQUINE SULFATE SCH MG: 200 TABLET, FILM COATED ORAL at 12:27

## 2018-02-21 VITALS — DIASTOLIC BLOOD PRESSURE: 90 MMHG | SYSTOLIC BLOOD PRESSURE: 153 MMHG

## 2018-02-21 VITALS — SYSTOLIC BLOOD PRESSURE: 141 MMHG | DIASTOLIC BLOOD PRESSURE: 87 MMHG

## 2018-02-21 VITALS — DIASTOLIC BLOOD PRESSURE: 57 MMHG | SYSTOLIC BLOOD PRESSURE: 140 MMHG

## 2018-02-21 VITALS — SYSTOLIC BLOOD PRESSURE: 151 MMHG | DIASTOLIC BLOOD PRESSURE: 90 MMHG

## 2018-02-21 RX ADMIN — APIXABAN SCH MG: 5 TABLET, FILM COATED ORAL at 18:48

## 2018-02-21 RX ADMIN — BENAZEPRIL HYDROCHLORIDE SCH MG: 20 TABLET, FILM COATED ORAL at 09:00

## 2018-02-21 RX ADMIN — CARVEDILOL SCH MG: 25 TABLET, FILM COATED ORAL at 09:00

## 2018-02-21 RX ADMIN — APIXABAN SCH MG: 5 TABLET, FILM COATED ORAL at 08:32

## 2018-02-21 RX ADMIN — Medication PRN GM: at 12:13

## 2018-02-21 RX ADMIN — LEVETIRACETAM SCH MG: 500 TABLET, FILM COATED ORAL at 08:33

## 2018-02-21 RX ADMIN — Medication SCH TAB: at 08:34

## 2018-02-21 RX ADMIN — HYDROMORPHONE HYDROCHLORIDE PRN MG: 2 INJECTION, SOLUTION INTRAMUSCULAR; INTRAVENOUS; SUBCUTANEOUS at 21:04

## 2018-02-21 RX ADMIN — PANTOPRAZOLE SODIUM SCH MG: 40 TABLET, DELAYED RELEASE ORAL at 18:48

## 2018-02-21 RX ADMIN — HYDROMORPHONE HYDROCHLORIDE PRN MG: 2 INJECTION, SOLUTION INTRAMUSCULAR; INTRAVENOUS; SUBCUTANEOUS at 17:11

## 2018-02-21 RX ADMIN — AMLODIPINE BESYLATE SCH MG: 10 TABLET ORAL at 09:00

## 2018-02-21 RX ADMIN — BENAZEPRIL HYDROCHLORIDE SCH MG: 20 TABLET, FILM COATED ORAL at 18:52

## 2018-02-21 RX ADMIN — PANTOPRAZOLE SODIUM SCH MG: 40 TABLET, DELAYED RELEASE ORAL at 06:51

## 2018-02-21 RX ADMIN — HYDROMORPHONE HYDROCHLORIDE PRN MG: 2 INJECTION, SOLUTION INTRAMUSCULAR; INTRAVENOUS; SUBCUTANEOUS at 06:52

## 2018-02-21 RX ADMIN — DOXAZOSIN MESYLATE SCH MG: 1 TABLET ORAL at 09:00

## 2018-02-21 RX ADMIN — LEVETIRACETAM SCH MG: 500 TABLET, FILM COATED ORAL at 18:48

## 2018-02-21 RX ADMIN — HYDROMORPHONE HYDROCHLORIDE PRN MG: 2 INJECTION, SOLUTION INTRAMUSCULAR; INTRAVENOUS; SUBCUTANEOUS at 12:57

## 2018-02-21 RX ADMIN — CARVEDILOL SCH MG: 25 TABLET, FILM COATED ORAL at 18:52

## 2018-02-21 RX ADMIN — HYDROXYCHLOROQUINE SULFATE SCH MG: 200 TABLET, FILM COATED ORAL at 08:32

## 2018-02-21 RX ADMIN — RENAGEL SCH MG: 800 TABLET ORAL at 18:48

## 2018-02-21 RX ADMIN — CINACALCET HYDROCHLORIDE SCH MG: 30 TABLET, COATED ORAL at 08:38

## 2018-02-21 RX ADMIN — RENAGEL SCH MG: 800 TABLET ORAL at 08:00

## 2018-02-21 RX ADMIN — DOXAZOSIN MESYLATE SCH MG: 1 TABLET ORAL at 18:51

## 2018-02-21 RX ADMIN — RENAGEL SCH MG: 800 TABLET ORAL at 12:13

## 2018-02-22 VITALS — DIASTOLIC BLOOD PRESSURE: 83 MMHG | SYSTOLIC BLOOD PRESSURE: 130 MMHG

## 2018-02-22 VITALS — SYSTOLIC BLOOD PRESSURE: 128 MMHG | DIASTOLIC BLOOD PRESSURE: 76 MMHG

## 2018-02-22 VITALS — SYSTOLIC BLOOD PRESSURE: 155 MMHG | DIASTOLIC BLOOD PRESSURE: 95 MMHG

## 2018-02-22 VITALS — SYSTOLIC BLOOD PRESSURE: 126 MMHG | DIASTOLIC BLOOD PRESSURE: 70 MMHG

## 2018-02-22 RX ADMIN — DOXAZOSIN MESYLATE SCH MG: 1 TABLET ORAL at 08:25

## 2018-02-22 RX ADMIN — CARVEDILOL SCH MG: 25 TABLET, FILM COATED ORAL at 16:51

## 2018-02-22 RX ADMIN — RENAGEL SCH MG: 800 TABLET ORAL at 17:00

## 2018-02-22 RX ADMIN — LEVETIRACETAM SCH MG: 500 TABLET, FILM COATED ORAL at 08:23

## 2018-02-22 RX ADMIN — AMLODIPINE BESYLATE SCH MG: 10 TABLET ORAL at 08:27

## 2018-02-22 RX ADMIN — CINACALCET HYDROCHLORIDE SCH MG: 30 TABLET, COATED ORAL at 08:27

## 2018-02-22 RX ADMIN — Medication SCH TAB: at 08:23

## 2018-02-22 RX ADMIN — PANTOPRAZOLE SODIUM SCH MG: 40 TABLET, DELAYED RELEASE ORAL at 06:46

## 2018-02-22 RX ADMIN — HYDROMORPHONE HYDROCHLORIDE PRN MG: 2 INJECTION, SOLUTION INTRAMUSCULAR; INTRAVENOUS; SUBCUTANEOUS at 22:42

## 2018-02-22 RX ADMIN — HYDROMORPHONE HYDROCHLORIDE PRN MG: 2 INJECTION, SOLUTION INTRAMUSCULAR; INTRAVENOUS; SUBCUTANEOUS at 05:16

## 2018-02-22 RX ADMIN — CARVEDILOL SCH MG: 25 TABLET, FILM COATED ORAL at 08:25

## 2018-02-22 RX ADMIN — SODIUM CHLORIDE PRN MG: 9 INJECTION, SOLUTION INTRAVENOUS at 13:41

## 2018-02-22 RX ADMIN — HYDROMORPHONE HYDROCHLORIDE PRN MG: 2 INJECTION, SOLUTION INTRAMUSCULAR; INTRAVENOUS; SUBCUTANEOUS at 18:34

## 2018-02-22 RX ADMIN — PANTOPRAZOLE SODIUM SCH MG: 40 TABLET, DELAYED RELEASE ORAL at 16:49

## 2018-02-22 RX ADMIN — HYDROMORPHONE HYDROCHLORIDE PRN MG: 2 INJECTION, SOLUTION INTRAMUSCULAR; INTRAVENOUS; SUBCUTANEOUS at 01:02

## 2018-02-22 RX ADMIN — HYDROMORPHONE HYDROCHLORIDE PRN MG: 2 INJECTION, SOLUTION INTRAMUSCULAR; INTRAVENOUS; SUBCUTANEOUS at 13:54

## 2018-02-22 RX ADMIN — HYDROMORPHONE HYDROCHLORIDE PRN MG: 2 INJECTION, SOLUTION INTRAMUSCULAR; INTRAVENOUS; SUBCUTANEOUS at 09:57

## 2018-02-22 RX ADMIN — HYDROXYCHLOROQUINE SULFATE SCH MG: 200 TABLET, FILM COATED ORAL at 08:23

## 2018-02-22 RX ADMIN — RENAGEL SCH MG: 800 TABLET ORAL at 08:23

## 2018-02-22 RX ADMIN — BENAZEPRIL HYDROCHLORIDE SCH MG: 20 TABLET, FILM COATED ORAL at 16:51

## 2018-02-22 RX ADMIN — RENAGEL SCH MG: 800 TABLET ORAL at 12:28

## 2018-02-22 RX ADMIN — Medication PRN GM: at 10:03

## 2018-02-22 RX ADMIN — LEVETIRACETAM SCH MG: 500 TABLET, FILM COATED ORAL at 16:49

## 2018-02-22 RX ADMIN — APIXABAN SCH MG: 5 TABLET, FILM COATED ORAL at 16:52

## 2018-02-22 RX ADMIN — BENAZEPRIL HYDROCHLORIDE SCH MG: 20 TABLET, FILM COATED ORAL at 08:26

## 2018-02-22 RX ADMIN — APIXABAN SCH MG: 5 TABLET, FILM COATED ORAL at 08:23

## 2018-02-22 RX ADMIN — DOXAZOSIN MESYLATE SCH MG: 1 TABLET ORAL at 16:54

## 2018-02-23 VITALS — DIASTOLIC BLOOD PRESSURE: 92 MMHG | SYSTOLIC BLOOD PRESSURE: 150 MMHG

## 2018-02-23 VITALS — DIASTOLIC BLOOD PRESSURE: 85 MMHG | SYSTOLIC BLOOD PRESSURE: 135 MMHG

## 2018-02-23 VITALS — SYSTOLIC BLOOD PRESSURE: 140 MMHG | DIASTOLIC BLOOD PRESSURE: 92 MMHG

## 2018-02-23 RX ADMIN — BENAZEPRIL HYDROCHLORIDE SCH MG: 20 TABLET, FILM COATED ORAL at 09:00

## 2018-02-23 RX ADMIN — Medication SCH TAB: at 09:09

## 2018-02-23 RX ADMIN — PANTOPRAZOLE SODIUM SCH MG: 40 TABLET, DELAYED RELEASE ORAL at 16:33

## 2018-02-23 RX ADMIN — LEVETIRACETAM SCH MG: 500 TABLET, FILM COATED ORAL at 09:09

## 2018-02-23 RX ADMIN — AMLODIPINE BESYLATE SCH MG: 10 TABLET ORAL at 09:09

## 2018-02-23 RX ADMIN — RENAGEL SCH MG: 800 TABLET ORAL at 12:17

## 2018-02-23 RX ADMIN — CINACALCET HYDROCHLORIDE SCH MG: 30 TABLET, COATED ORAL at 09:00

## 2018-02-23 RX ADMIN — HYDROXYCHLOROQUINE SULFATE SCH MG: 200 TABLET, FILM COATED ORAL at 09:10

## 2018-02-23 RX ADMIN — LEVETIRACETAM SCH MG: 500 TABLET, FILM COATED ORAL at 16:33

## 2018-02-23 RX ADMIN — DOXAZOSIN MESYLATE SCH MG: 1 TABLET ORAL at 16:33

## 2018-02-23 RX ADMIN — HYDROMORPHONE HYDROCHLORIDE PRN MG: 2 INJECTION, SOLUTION INTRAMUSCULAR; INTRAVENOUS; SUBCUTANEOUS at 07:05

## 2018-02-23 RX ADMIN — PANTOPRAZOLE SODIUM SCH MG: 40 TABLET, DELAYED RELEASE ORAL at 06:24

## 2018-02-23 RX ADMIN — HYDROMORPHONE HYDROCHLORIDE PRN MG: 2 INJECTION, SOLUTION INTRAMUSCULAR; INTRAVENOUS; SUBCUTANEOUS at 15:05

## 2018-02-23 RX ADMIN — RENAGEL SCH MG: 800 TABLET ORAL at 18:00

## 2018-02-23 RX ADMIN — HYDROMORPHONE HYDROCHLORIDE PRN MG: 2 INJECTION, SOLUTION INTRAMUSCULAR; INTRAVENOUS; SUBCUTANEOUS at 02:52

## 2018-02-23 RX ADMIN — HYDROMORPHONE HYDROCHLORIDE PRN MG: 2 INJECTION, SOLUTION INTRAMUSCULAR; INTRAVENOUS; SUBCUTANEOUS at 11:05

## 2018-02-23 RX ADMIN — SODIUM CHLORIDE PRN MG: 9 INJECTION, SOLUTION INTRAVENOUS at 16:28

## 2018-02-23 RX ADMIN — Medication PRN GM: at 09:12

## 2018-02-23 RX ADMIN — APIXABAN SCH MG: 5 TABLET, FILM COATED ORAL at 16:34

## 2018-02-23 RX ADMIN — CARVEDILOL SCH MG: 25 TABLET, FILM COATED ORAL at 16:33

## 2018-02-23 RX ADMIN — BENAZEPRIL HYDROCHLORIDE SCH MG: 20 TABLET, FILM COATED ORAL at 16:33

## 2018-02-23 RX ADMIN — APIXABAN SCH MG: 5 TABLET, FILM COATED ORAL at 09:10

## 2018-02-23 RX ADMIN — CARVEDILOL SCH MG: 25 TABLET, FILM COATED ORAL at 09:00

## 2018-02-23 RX ADMIN — RENAGEL SCH MG: 800 TABLET ORAL at 09:07

## 2018-02-23 RX ADMIN — DOXAZOSIN MESYLATE SCH MG: 1 TABLET ORAL at 09:08

## 2019-02-04 NOTE — NUR
DAQUAN PALMA HERE TO PLACE THE PICCLINE. PT REFUSES PICCLINE OR MIDLINE. PT SAYS 
SHE HAS TOO MANY SCAR TISSUES. PT SAYS ONLY CENTRAL LINE WORKS FOR HER. DR. LYN NOTIFIED.

## 2019-02-04 NOTE — NUR
RECEIVED PATIENT LYING IN BED. IN NO ACUTE DISTRESS. ALERT AND ORIENTED. HAS COMPLAINTS OF 
8/10 PAIN IN THE ABDOMINAL AREA. NO COMPLAINTS OF SOB. IV HEPLOCK IS INTACT AND PATENT ON 
THE LEFT WRIST. SAFETY MEASURES INITIATED. BED IS LOW AND LOCKED, CALL LIGHT WITHIN REACH. 
WILL CONTINUE TO MONITOR.

## 2019-02-04 NOTE — NUR
-------------------------------------------------------------------------------

          *** Note undone in ED - 02/04/19 at 1231 by HERRERA ***          

-------------------------------------------------------------------------------

DR. MULLEN TALKING TO DR. MULLEN OVER THE PHONE

## 2019-02-05 NOTE — NUR
nsg: pt received a/o x4, denies pain, n/v, itching.  heplock on tko. ambulatory without 
assist.  cont to monitor.

## 2019-02-05 NOTE — NUR
PATIENT SLEPT WELL THROUGHOUT NIGHT. NO SIGNS OF ACUTE DISTRESS. ALL MEDICATIONS GIVEN AS 
ORDERED. DILAUDID WAS GIVEN FOR PAIN AT 2145H AND 0323H WITH EFFECT. NO COMPLAINTS OF SOB. 
SAFETY MEASURES GIVEN. IV ON THE LEFT WRIST IS INTACT AND PATENT.

## 2019-02-06 NOTE — NUR
DIALYSIS WAS DONE, TAKEN OUT 2500 CC PER PATIENT REQUEST ACCORDING TO DIALYSIS NURSE, NO 
ADVERSE CHANGES NOTED, NO BLEEDING NOTED, FROM SHUNT ON LEFT THIGH, DRESSING INTACT, NO 
BLEEDING NOTED. CONT TO MONITOR.

## 2019-02-06 NOTE — NUR
RECEIVED PATIENT IN BED, ALERT ORIENTED, NO SOB NO CHEST PAIN, NO COMPLAIN OF PAIN AT THIS 
TIME. PATIENT FOR DIALYSIS TONIGHT, AWAITING FOR DIALYSIS NURSE.

## 2019-02-07 NOTE — NUR
PATIENT DISCHARGE HOME, PATIENT WILL BE  BY FRIEND, PATIENT STAYING IN THE LOBBY WITH 
THE GUARDS, PATIENT IN FAIR CONDITION, DICHARGE PAPERS WAS SIGNED AND GIVEN TO PATIENT BY AM 
NURSE. PATIENT TOOK ALL BELONINGS.

## 2019-03-04 NOTE — NUR
RECEIVED PATIENT FROM ED 33 YEARS OLD FEMALE TO ROOM 221 WITH DX OF ABDOMINAL PAIN PLACED 
INTO BED FIXED AND MADE COMFORTABLE ORIENTED TO ROOM AND FACILITY PROTOCOL PATIENT HAS AV 
SHUNT ON HER LEFT THIGH WITH GOOD BRUIT AND THRILL LEFT FA MID LINE INTACT CALLED DR FREEMAN RE ADMISSION ORDERS WITH DIET ORDER AT THIS TIME.

## 2019-03-04 NOTE — NUR
RECEIEVED SHIFT REPORT FROM DAQUAN NGUYEN. 



RECEIVED PT RESTING COMFORTABLY IN BED. PT DENIES ANY DISCOMFORT AT THIS TIME.

## 2019-03-05 NOTE — NUR
PATIENT 0715 BP MEDS NOT GIVEN, PATIENT HAVING DIALYSIS AT THIS TIME, PATIENT PREFER NOT TO 
TAKE TIME DUE WORRIES THAT BP WILL GO DOWN SO LOW. ENDORSED TO NEXT SHIFT.

## 2019-03-05 NOTE — NUR
DIALYSIS REMAINS IN PROGRESS AT THIS TIME PATIENT IS RESTING WITH NO C/O NOTED WILL CONTINUE 
TO OBSERVE

## 2019-03-05 NOTE — NUR
Received patient lying in bed. AAOx4. In no acute distress. Denies any pain or SOB. Midline 
on right FA intact and patent. Dialysis site on left thigh with bruit and thrill present. 
Needs assessed and attended to. Safety measure initiated and call bell within reach.

## 2019-03-05 NOTE — NUR
PATIENT SLEPT MOST OF THE NIGHT, CONT ON PAIN MANAGEMENT ON LEFT SIDE ABDOMEN FLANK, WITH NO 
ADVERSE REACTION NOTED. AV SHUNT ON LEFT THIGH DRESSING INTACT, DIALYSIS ON GOING AT THIS 
TIME.  CONT TO MONITOR.

## 2019-03-06 NOTE — NUR
AAOx4. In no acute distress. Denies any pain or SOB. Midline on right FA intact and patent. 
Dialysis site on left thigh with bruit and thrill present. Needs attended to and met. Safety 
measure maintained and call bell within reach.

## 2019-03-06 NOTE — NUR
Patient in bed resting comfortably with  no acute distress noted at this time. Having an 
ongoing dialysis.  Denies any pain. IV midline  on forearm  intact and patent. Dialysis site 
on left thigh with bruit and thrill present. will continue care with  night shift nurse

## 2019-03-06 NOTE — NUR
Patient in bed resting comfortably with  no acute distress noted at this time. Denies any 
pain. IV midline  on forearm  intact and patent. Dialysis site on left thigh with bruit and 
thrill present. Safety  provide and  call bell within reach.

## 2019-03-06 NOTE — NUR
INFORMATION SENT: ADAM JEFFREY NOTES 3/5,CONSULTATION,UR 3/5

INSURANCE NAME: [employer] / [employer]

FAX NUMBER: [phone] / [phone]

FAX  SENT

## 2019-03-07 NOTE — NUR
PATIENT DISCHARGED TO HOME. DISCHARGED INSTRUCTIONS AND MED RECON GIVEN AND WENT OVER WITH 
PATIENT. BELONGINGS LIST WENT OVER WITH PATIENT. PATIENT IN STABLE CONDITION. VS STABLE 
THROUGHOUT THE SHIFT. MIDLINE DC AND LENGTH WAS AT 11CM WHEN PULLED OUT. ALL CARE TOLERATED 
WELL. PATIENT ARRANGED FOR LIFT TO PICK HER UP AND TAKE HER HOME.

## 2019-03-07 NOTE — NUR
INFORMATION SENT: ADAM JEFFREY NOTES 3/6,CONSULTATION,UR 3/6

INSURANCE NAME: [employer]

FAX NUMBER: [phone] / [phone]

FAX  SENT

## 2019-03-07 NOTE — NUR
AAOx4. In no acute distress. Denies any SOB. Dilaudid 1mg IV PRN per order given for 
complain of pain and effective. Midline on right FA intact and patent. Dialysis site on left 
thigh with bruit and thrill present. Needs attended to and met. Safety measure maintained 
and call bell within reach.

## 2019-03-07 NOTE — NUR
PATENT IN BED AOX4. COMPLAINS OF MILD PAIN TO BACK, 1/10, BUT DECLINED PAIN MEDICATION AT 
THIS TIME. RT. FA IV INTACT AND FLUSHED. DENIES ABDOMINAL PAIN AT THIS TIME, CHEST PAIN OR 
SOB. MILD SWELLING TO LIPS. ALL NEEDS MET. SAFETY PRECAUTIONS IN PLACE. WILL CONTINUE TO 
MONITOR.

## 2019-03-08 NOTE — NUR
INFORMATION SENT: FACESHEET,CONSULTATION,DISCHARGE SUMMARY,PROGRESS NOTES 3/7,UR 3/7

INSURANCE NAME: [employer] 

FAX NUMBER: [phone] / [phone]

FAX  SENT

## 2019-06-14 NOTE — NUR
Received pt lying on bed awake watching TV. C/o of itchiness, will notify MD. Able to turn 
self, pt denies pain at this time. Will continue to monitor

## 2019-06-14 NOTE — NUR
nat Valencia AT BEDSIDE FOR PATIENTE EVALUATION. PATIENT TO BE ADMITTED TO 
TELEMETRY, SHE IS CLEARED TO BE PICKED UP FOR CT AFTER HER ADMISSION PER ER MD.

## 2019-06-14 NOTE — NUR
Pt c/o pain, PRN dilaudid prepared but IV site infiltrated. Several attempts of IV insertion 
unsuccessful. Notified MD and received orders to have PO medication for Zofran 4 mg Q6H PRN, 
PO Dilaudid 1mg Q4H PRN and PO Benadryl 50 mg Q6H PRN. ALso ordered for midline insertion 
tomorrow. Pt made aware, pt stable at this time. Will continue to monitor.

## 2019-06-14 NOTE — NUR
Received new order for Benadryl 50 MG IV per Jerry Lindquist NP. Order carried out. Pt is 
stable, will continue to monitor.

## 2019-06-14 NOTE — NUR
Received pt lying on bed asleep. No s/sx of distress. Repositioned for comfort, will 
continue to monitor

-------------------------------------------------------------------------------

Addendum: 06/15/19 at 0349 by GIOVANNI MENDOZA RN

-------------------------------------------------------------------------------

wrong  patient

## 2019-06-14 NOTE — NUR
Received patient report from Luke BUTT prior to patient admission. Patient on telemetry 
monitor. Will continue to carry out plan of care.

## 2019-06-14 NOTE — NUR
BLS transport for CT scan of abdomen in [facility]. BLS transport ok by Ameena NAVARRETE. Unit [id number] Zac Carlisle A.

## 2019-06-14 NOTE — NUR
Patient came in to the ER with chief complaint of pain  all over her body, 
chest and left arm area. Patient is a dialysis patient. Last dialysis was 
Wednesday 6/12/19. Patient reported Hx of 4 surgery. New catheter for dialysis 
on right leg was placed on 6/6/19. Patient also reported n/vx2 days. Also 
reported pain from the left side of her abdomen that radiates on her chest and 
left arm. Reported taking Norco 10/325mg for pain on Wednesday 6/12/19 but with 
no relief. Came in here today for further evaluation. Patient AAOx4. In no 
acute distress. Denies any SOB.

## 2019-06-14 NOTE — NUR
Patient resting in bed, no distress noted. Pain controlled with Dilaudid 1mg Q 4 hours PRN. 
Refusing stool sample. CT of abdomen done today in [facility]. On telemetry 
monitor, Sinus Rhythm. MRSA obtained in ER today, pending. Transfused one unit today during 
dialysis, 1800ml out. Will endorse care to oncoming shift.

## 2019-06-15 NOTE — NUR
Pt now sleeping in bed, easily arousable. Not in apparent distress, pt tolerated PRN PO 
dilaudid 1 mg for pain as ordered. No c/o nausea or vomiting noted. Needs attended to, will 
endorse to incoming nurse for continuity of care.

## 2019-06-15 NOTE — NUR
RECEIVED PT AWAKE, ALERT AND ORIENTEDX4. PT SHOWS NO SIGNS OF ACUTE DISTRESS. PT IV INTACT. 
SAFETY AND COMFORT PROVIDED. WILL CONTINUE TO MONITOR.

## 2019-06-15 NOTE — NUR
RECIEVED PT LYING IN BED VERY SOUND ASLEEP. NO APPARENT RESPIRATORY DISTRESS NOTED. TELE-IS 
SR NO ECTOPY.

## 2019-06-15 NOTE — NUR
PT C/O OF ABDOMENAL AND GENERALIZED PAIN AND NAUSEA LEVEL 8 AND REQUESTED FOR HER PAIN 
MEDICATION WITH BENADRYL AND ZOFRAN. MEDICATED VIA THE MIDLINE.

## 2019-06-16 NOTE — NUR
patient in bed, alert and oriented x4  with no SOB and c/o pain and prn pain medications 
given as ordered. bed in low position and 2 siderails up , safety and comfort provided at 
all times, will continue to monitor.

## 2019-06-16 NOTE — NUR
Report received. Patient AAO. NAD noted. Plan of care discussed with patient; verbalized 
understanding.

## 2019-06-16 NOTE — NUR
received patient with dialysis at bedside, alert and oriented x4  with no SOB and no c/o 
pain at this time. bed in low position and 2 siderails up , safety and comfort provided at 
all times, will continue to monitor.

## 2019-06-16 NOTE — NUR
PT SLEPT THROUGHOUT THE SHIFT.PT SHOWS NO SIGNS OF ACUTE DISTRESS. PRESCRIBED MEDICATION 
GIVEN AND PT TOLERATED IT WELL.PT GIVEN DILAUDID Q4H FOR 8/10 PAIN SCALE ON HER ABDOMEN. PT 
TOLERATED IT WELL. PT GIVEN BENADRYL TWICE ON MY SHIFT FOR ITCHINESS. PT COMPLAINING OF PAIN 
ON HER RIGHT INNER THIGH BECAUSE OF THE STAPLES.  SAFETY AND COMFORT. PROVIDED. ALL NEEDS 
ARE MET. WILL ENDORSE ACCORDINGLY TO INCOMING NURSE FOR CONTINUITY OF CARE.

## 2019-06-17 NOTE — NUR
Discharge instructions given regarding medications, activities, diet and follow up 
appointments. ZI midline removed. Encouraged for questions, none raised.

## 2019-07-01 NOTE — NUR
11:00am:  SW arrived to the ED, met with Dr. Hylton to discuss patient's case and psychosocial 
needs.  SW then met with patient, who was in her assigned ED bed.  Patient is a 33 year old 
-American female, who came to the ED today for abdominal pain.  Patient was receptive 
to meeting with this SW, however was in a fetal position in the bed, eyes closed.  Patient 
is oriented x 4. Patient stated that she usually uses [employer] for transportation, which is paid 
by her insurance.  Patient stated that her insurance authorizes a set number of rides per 
year, and that she has already used all authorized rides for the year.  Patient stated that 
she is scheduled to go to dialysis Mondays, Wednesdays, and Fridays at 5am, however missed 
her dialysis today because she only had one ride left and chose to come to the ED because 
she was having abdominal pain.  SW asked patient if she wanted information on other 
transportation services, and patient declined.  Patient stated that she will only use [employer] 
and that she will be calling her insurance later on today to discuss her transportation 
benefits, and is hoping to get more insurance authorized [employer] rides.  SW asked patient if 
she had any other questions or concerns, and patient stated "no".  No further SS 
interventions needed at this time.  SW informed Dr. Hylton and DAQUAN Aragon about above.

## 2019-07-01 NOTE — NUR
Patient discharged to home in stable conditon.  Written and verbal after care 
instructions given. 

Patient verbalizes understanding of instructions.

Pt left Er w/ steady gait.

## 2019-07-01 NOTE — NUR
PLACED A CALL TO PT'S HD CENTER([facility] @ [phone]). MADE AN 
APPOINTMENT FOR PT FOR 1245, PER MD REQUEST.

## 2019-07-01 NOTE — NUR
DR CORREA REQUESTED FOR PT TO HAVE ARTERIAL LAB DRAW, PT REFUSED. PT ALSO WAS 
NOTIFY OF HER APPOINTMENT TO HD CENTER.

## 2020-01-19 NOTE — NUR
PATIENT  IN BED  RESTING, NO SOB NOTED AND NO C/O PAIN AT THIS TIME, ALL NEEDS ATTENDED. 
WILL CONTINUE TO  MONITOR AND CALL LIGHT   WITHIN REACH.

## 2020-01-19 NOTE — NUR
PATIENT ALERT ORIENTED, NO SOB NO CHEST PAIN, PATIENT ON TELE MONITOR SINUS RHYTHM AT THIS 
TIME.  PATIENT HAS EPISODE OF ANXIETY, WILL MEDICATE AS ORDERED, BP ELEVATED WITH MEDICATE 
PATIENT AS ORDERED, CONT TO MONITOR.

## 2020-01-19 NOTE — NUR
PATIENT ALERT ORIENTED, NO SOB NO CHEST PAIN, PATIENT BP STILL ELEVATED, BY ASYMPTOMATIC NO 
HEADACHES, NO DIZZINESS, NO NAUSEA NO VOMITING, PATIENT IS DIALYSIS PATIENT, RICHARD CATH ON 
R GROIN, DRESSING INTACT NO BLEEDING NOTED, CONT TO MONITOR.

## 2020-01-19 NOTE — NUR
RECEIVED PATIENT FROM ER AND ADMITTED TO  TELE UNIT WITH DIAGNOSIS OF CHRONIC RENAL FAILURE, 
IV IN THE RIGHT UPPER ARM WITH 20 GUAGE, INTACT AND PATENT. BELONGING ACCOUNTED AND SIGNED. 
PATIENT ALERT AND ORIENTED X3, AMBULATORY AND ABLE TO MAKE NEEDS KNOWN PATIENT HAS A RICHARD 
CATH OF THE RIGHT GROIN . SAFETY AND COMFORT PROVIDED, CALL LIGHT WITHIN REACHED , WILL 
CONTINUE TO MONITOR.

## 2020-01-20 NOTE — NUR
PATIENT IN BED ALERT ORIENTED, NO SOB NO CHEST PAIN, TELE MONITOR SINUS RHYTHM. PATIENT HAS 
NO COMPLAIN OF PAIN AT THIS TIME, NO ITCHING AT THIS TIME. CONT TO MONITOR.

## 2020-01-20 NOTE — NUR
Awake, alert, oriented x 4, complaining of headache not relieved by Tylenol, nausea, and 
diarrhea. Discussed plan of care

## 2020-01-20 NOTE — NUR
PATIENT SLEPT MOST OF THE NIGHT, NO SOB NO CHEST PAIN, STILL ELEVATED BP, BUT ASYMPTOMATIC, 
NO DIZZINESS, NO HEADACHES, NO NAUSE NO VOMITING, PHILLIP MORILLO NP AWARE. CONT TO MONITOR.

## 2020-01-20 NOTE — NUR
CALLED PHILLIP MORILLO REGARDING PATIENT REQUESTING BENADRYL FOR ITCHING, WITH ONE TIME ORDER 
OF BENADRYL 25MG IV, FOR ITCHING. NOTIFY PHILLIP MORILLO ALSO THAT PATIENT BP STILL HIGH 
170/100, 178/106, 168/108, ELEVATED DESPITE NUMEROUS BP MEDICATIONS WITH NO NEW ORDER.

## 2020-01-20 NOTE — NUR
Complaining of headache, Dilaudid IV given as ordered. Instructed to call if with loose BM 
and not to flush toilet

## 2020-01-20 NOTE — NUR
Nauseated with headache. Zofran and Dilaudid IV given, with relief. IV site with resistance, 
poor peripheral IV site, requested for midline

## 2020-01-21 NOTE — NUR
PATIENT SLEPT INTERMITTENTLY, CONT ON PAIN MANAGEMENT, WITH EPISODE OF GEN BODY ITCHING 
GIVEN BENADRYL WITH EFFECTIVE RESULTS, CONT TO MONITOR.

## 2020-01-21 NOTE — NUR
PATIENT ALERT ORIENTED NO SOB NO CHEST PAIN, TELE MONITOR SINUS RHYTHM, BP STABLE, CONT PAIN 
MANAGEMENT. CONT TO MONITOR.

## 2020-01-22 NOTE — NUR
RECEIVED PATIENT IN BED, ASLEEP, EASY TO AWAKE. RIGHT UPPER MIDLINE INTACT AND FLUSHED. 
RIGHT GROIN DIALYSIS CATH INTACT. PATIENT DENIES PAIN AT THIS TIME. SR 80'S ON TELEMETRY 
MONITOR. SAFETY AND FALL PREVENTION IN PLACE. CALL LIGHT IN REACH. BED IN LOW AND LOCKED 
POSITION. WILL CONTINUE TO MONITOR.

## 2020-01-22 NOTE — NUR
PATIENT IN BED, ASLEEP, EASY TO AWAKE. RIGHT UPPER MIDLINE INTACT AND FLUSHED. RIGHT GROIN 
DIALYSIS CATH INTACT.PATIENT HAD DIALISIS TODAY. TREATED WITH PAIN MEDICATIONS AS ORDERED.. 
SR 80'S ON TELEMETRY MONITOR. SAFETY AND FALL PREVENTION IN PLACE. CALL LIGHT IN REACH. BED 
IN LOW AND LOCKED POSITION. WILL REPORT TO ONCOMING NURSE.

## 2020-03-04 NOTE — NUR
PT SIGNED CONSENT FORM FOR BLOOD TRANSFUSION HOWEVER PT REFUSED BLOOD TRANSFUSION . SHE 
WANTS BLOOD TRANSFUSION TO BE DONE ON HER PERMACATH ACCESS DURING DIALYSIS.  PT IN NO ACUTE 
DISTRESS. PT WANTS STRONGER PAIN MEDICATION.

## 2020-03-04 NOTE — NUR
RECEIVED PT IN NO ACUTE DISTRESS. IV INTACT. PT ON DIALYSIS AS OF THE MOMENT.SAFETY AND 
COMFORT PROVIDED. WILL CONTINUE TO MONITOR.

## 2020-03-04 NOTE — NUR
CHARGE NURSE AWARE OF THE BLOOD TRANSFUSION THAT PT REFUSED. NOTIFY  ON CALL ALSO 
REGARDING PT REFUSAL. SHE WANTS IT TO BE GIVEN WITH DIALYSIS. PT IN NO ACUTE DISTRESS. WILL 
CONTINUE TO MONITOR.

## 2020-03-04 NOTE — NUR
2030

-------------------------------------------------------------------------------

Addendum: 03/04/20 at 2120 by VICTOR M GUNDERSON RN

-------------------------------------------------------------------------------

TYPE ERROR

## 2020-03-04 NOTE — NUR
34 year old female received from er via gurney to room 318 for anemia ,pt is axox4 .md 
called for admission orders,call light with in reach

## 2020-03-05 NOTE — NUR
PATIENT AWAKE AND ALERT. NO C/O PAIN AND NO S/S OF ACUTE DISTRESS NOTED AT  THIS TIME.   
KEPT CLEAN AND DRY AT ALL TIMES.  CALL LIGHT WITHIN REACH, SAFETY AND COMFORT PROVIDED. 
CONTINUE TO MONITOR.

## 2020-03-05 NOTE — NUR
PT SLEPT INTERMITTENTLY. PT IN NO ACUTE DISTRESS. IV INTACT. PRESCRIBED MEDICATION GIVEN AND 
PT TOLERATED IT WELL. IMITREX GIVEN . PT TOLERATED IT WELL.SAFETY AND COMFORT PROVIDED. WILL 
ENDORSE TO INCOMING NURSE FOR CONTINUITY OF CARE.

## 2020-03-05 NOTE — NUR
RECEIVED PT AWAKE, ALERT AND ORIENTEDX4. PT IN NO ACUTE DISTRESS. IV INTACT. SAFETY AND 
COMFORT PROVIDED. ALL NEEDS ARE MET. WILL ENDORSE TO INCOMING NURSE FOR CONTINUITY OF CARE.

## 2020-03-05 NOTE — NUR
PT REFUSED IMITREX SUBCUTANEOUS. CALLED  ON CALL IF IT CAN BE CHANGED TO PO.  ORDERED 
IMITREX 50 MG PO FOR PT MIGRAINE BY DR. AMBERLY ROQUE.

## 2020-03-05 NOTE — NUR
RECEIVED PATIENT IN BED AWAKE AND ALERT. NO C/O PAIN AND NO S/S OF ACUTE DISTRESS NOTED AT  
THIS TIME.   KEPT CLEAN AND DRY AT ALL TIMES.  CALL LIGHT WITHIN REACH, SAFETY AND COMFORT 
PROVIDED. CONTINUE TO MONITOR.

## 2020-03-06 NOTE — NUR
Patient requesting for antihistamine for itching on her L thigh d/t staples, n.o from Dr. Mcdowell for Hydrocortisone cream BID PRN for itching. Informed patient about the new 
order but patient stated she wants oral meds because she can't apply it to her thigh. 
Informed Dr. Mcdowell about patient's concern w/ n.o for Benadryl 25mg PO BID PRN for 
itching. Patient made aware and agreed

## 2020-03-06 NOTE — NUR
PT SLEPT INTERMITTENTLY. PT IN NO ACUTE DISTRESS. IV INTACT.  PRESCRIBED MEDICATION GIVEN 
AND PT TOLERATED IT WELL.  DILAUDID GIVEN AT 2243H and 0454h FOR PAIN ON HER LEFT THIGH. PT 
TOLERATED IT WELL. SAFETY AND COMFORT PROVIDED. ALL NEEDS ARE MET. WILL ENDORSE TO INCOMING 
NURSE FOR CONTINUITY OF CARE.

## 2020-03-06 NOTE — NUR
Patient in bed, awake and verbally responsive. No signs of distress noted. No SOB. Pain 
medication dilaudid 0.25mg given as ordered for Pain. Patient was dialyzed today with output 
of 2500. tolerated well. all due medications given as ordered. kept clean and comfortable, 
kept the call light within easy reach. Will endorse to Oncoming Nurse.

## 2020-03-07 NOTE — NUR
Patient is awake, alert and verbally responsive. No signs of distress noted. No SOB. Pain 
medication, Dilaudid 0.25mg given as ordered for Pain. Procrit 10,000 units SQ given as 
ordered. recent Hgb is 7.7. Patient with Order to be discharge today, Discharge Instructions 
given to patient and verbalized understanding. All belongings signed and sent with Patient. 
Removed Wrist band and IV site. Patient was discharge via Private car in stable condition.

## 2020-03-07 NOTE — NUR
Patient slept intermittently. No SOB noted. IV on R hand 20g intact and patent. Patient 
noted non compliant w/ her fluid restriction, risks and benefits explained. All needs 
attended. Will endorse accordingly

## 2020-03-07 NOTE — NUR
Received patient in bed, On CPAP with daughter at bedside. No signs of distress noted. No 
signs of SOB. No signs of pain or discomfort. Carlisle catheter draining with clear yellow 
Urine. Kept comfortable. Will continue to monitor.

-------------------------------------------------------------------------------

Addendum: 03/07/20 at 1234 by MELISSA ZAMARRIPA RN

-------------------------------------------------------------------------------

Received patient in bed, awake , alert and verbally responsive. No signs of distress noted. 
No SOB. No complain of Pain or discomfort at this time. kept clean and comfortable. Will 
continue to monitor.

## 2020-06-06 NOTE — NUR
ANOTHER 30 MGOF LABETOLOL GIVEN VERY SLOWLY PER MD ORDER.   HR 88, BP AT 
195/143.  DR GIBBONS NOTIFIED.

## 2020-06-06 NOTE — NUR
Pt placed on BIPAP (V-60) on setting of IPAP 8, EPAP 4, set resp. rate 14 and FIO2-21% via 
medium full face mask. No resp. distress noted. Pt appears to be tolerating BIPAP settings 
well. Pt to be monitored throughout the duration of the shift. V-60 alarm parameters have 
been checked and remain audible.

## 2020-06-07 NOTE — NUR
Pt rested well in between care; c/o pain x2; medicated twice as well;  BP more controlled; 
needs attended; admission procedures done; report given to Dixon.

## 2020-06-07 NOTE — NUR
Pt is in no acute distress.  PT's pain, itching, and n/v managed with following meds 
-Dilaudid, benadryl, and Zofran.  Plan of care effective.  Pt's current sbp of 150's.  Pt is 
in no acute distress.  Call light is within reach.

## 2020-06-07 NOTE — NUR
Received patient sitting in bed. AAOx4. In no acute distress. Denies any pain or SOB at this 
time. Right groin dialysis site intact. Midline on left FA intact and patent. NSR on tele at 
67/min. Needs assessed and attended to. Safety measure initiated and call bell within reach. 
Continue to monitor.

-------------------------------------------------------------------------------

Addendum: 06/07/20 at 2007 by PATRICIA HSIPMAN RN

-------------------------------------------------------------------------------

NSR on tele at 91/min.

## 2020-06-07 NOTE — NUR
Discussed plan of care with patient to notify nursing for any c/o pain management, n/v, 
itching.  Pt agreeable with plan of care.  Pt ambulatory with good balance.  pt has IV on 
left thumb patent.  Pt is for dialysis today per dr berman.  HD nurse DEANNE valencia at the 
hospital tx another pt and mohan is to follow. Call light is within reach.

## 2020-06-07 NOTE — NUR
Pt tolerated HD.  HD rn took out 2Liters of fluid.  Midline on left forearm inserted by PICC 
line nurse Joby BUTT. Pt was seen by Cardiologist DR. Castro and adjusted b/p meds secondary 
to elevated b/p.  Pt however asymptomatic with SBP @ 190's   Will continue to monitor pts 
b/p.

## 2020-06-08 NOTE — NUR
RECIEVED PT LYING IN BED, AWAKE,ALERT AND ORIENTEDX3. APPEAQRS IN GOOD SPIRIT. HR IS SR.  
HAS A MIDLINE IV ACCESS ON THE LEFT FA G18 AND A RICHARD CATHETER ON THE RIGHT GROIN.

## 2020-06-08 NOTE — NUR
PT REQUESTED TO GET HER PAIN MEDICATION TOGETHER WITH BENADRYL FO HER GENERALZED BODY PAINS 
LEVEL 8/10. MEDICATED WITH DILAUDID 1MG AND BENADRYL 25MG SLOW IVP VIA HER MIDLINE IV ACCESS 
ON THE LEFT FA.

## 2020-06-08 NOTE — NUR
AAOx4. In no acute distress. Dilaudid 1mg IV PRN per order given for complain of pain and 
effective. Denies SOB. Right groin dialysis site intact. Midline on left FA intact and 
patent. NSR on tele at 81/min. Needs attended to and met. Safety measure maintained and call 
bell within reach.

## 2020-06-08 NOTE — NUR
PT'S BLOOD PRESSURE IS NORMAL BUT PT IS TAKING MULTIPLE MEDS FOR HER BP. PT KNOWS HER MEDS 
VERY WELL, HER BP /82. ALL HER BP MEDICATIONS NOT GIVEN.

## 2020-06-08 NOTE — NUR
RECEIVED PT. AAOX4. ON RM AIR. MIDLINE INTACT & PATENT ON SONJA. RICHARD CATH INTACT ON R 
GROIN. NOT IN ANY DISTRESS.

## 2020-06-08 NOTE — NUR
MEDICATED WITH DILAUDID 1MG AND ZOFRAN 4MG SLOW IVP FOR C/O CHRONIC HA LEVEL 8. PT IS NOT 
SLEEPING.

## 2020-06-09 NOTE — NUR
Ryley involvement re pt discharge secondary to pt requesting for ELmiron 
medication.  Dr berman and DR roberson refused to prescribe medication.  Rlyey 
explained to  pt that she is to get Elmiron prescription from her primary doctor.  Pt 
acknowledge 's decision.

## 2020-06-09 NOTE — NUR
Pt is in no acute distress.  Discharge instructions give to pt.  Pt verbalize understanding. 
 Midline taken off on left forearm.

## 2020-06-09 NOTE — NUR
Discussed plan of care with pt re management of any c/o pain , fall precaution, n/v, and 
itching.  Pt agreeable with plan of care.  Pt is for HD plan today.  Awaiting dialysis.  PT 
c/o acid in her stomach and states to call DR martins for a protonix order.  Placed call from 
dr Martins.

## 2020-06-14 NOTE — NUR
PT IS AROUSABLE AND NODDED TO CONSENT FOR THE BLOOD TRANSFUSION AND DR JIMENEZ FROM ER SIGNED 
THE CONSENT FOR 1 UNIT OF BLOOD. 1 UNIT IS INFUSED THROUGH DIALYSIS AND PT TOLREATED WELL.

## 2020-06-14 NOTE — NUR
Systolic blood pressure briefly dropped below 160, but then began increasing above 160. 
Increased nitroglycerine infusion to 40mcg/min. will continue monitoring.

## 2020-06-14 NOTE — NUR
Blood pressure steadily decreasing as a result of the PO medications. Currently 168/109. I 
just administered two more PO hypertension medications that were due. Will continue to 
monitor.

## 2020-06-14 NOTE — NUR
Patient requested pain medication, diphenhydramine, and something for heartburn, however 
morphine was ordered and this is listed as an allergy. Spoke with Dr. Evans and he 
ordered the following:

Hydromorphone 1mg IV q6hr PRN

Diphenhydramine 25mg IV q6hr PRN

Pantoprazole 40mg IV x one now(he was made aware scheduled PO dose starts in the AM)



Orders entered and previous morphine order discontinued.

## 2020-06-14 NOTE — NUR
unable to establish heplock. picc line nurse will be here at 1300. md ordered 
labotolo via dialysis cath. pt refused at this point and requestd to rest and 
let the pain med kicks in. md oked.

## 2020-06-14 NOTE — NUR
ADMIT A 35 YO FEMALE FROM THE ER VIA GURNEY TO CCU1 WITH A C/O SOB AND HIGH BLOOD PRESSURE. 
PT IS OBTUNDED BUT AROUSABLE TO CALL. WAS MEDICATED FOR HER PAIN IN THE ER. NITROGLYCERIN 
DRIP IN PROGRESS AT 30MCG/MIN VIA THE RIGHT FA IV ACCESS.  BP /121. HR IS SR NO 
ECTOPY. O2 ON 3LNC SATURATION %. FRANCISCO JAVIER 97.5.

## 2020-06-14 NOTE — NUR
Received patient in bed, just finishing up hemodialysis. Patient is a new admission that 
came from the ER with a diagnosis of CHF exacerbation and hypertensive crisis. Patient is 
awake and A/O x 3, however confused as to how she ended up in the CCU. Patient has 20g IVs 
in the left forearm and 1 in the right forearm. Patient is currently running on a 
nitroglycerine infusion running at 35mcg/min. Her blood pressure is 206/133. Patient has not 
yet received her PO hypertension medications as she was previously obtunded from medications 
given while in the ER. Plan of care is to reduce systolic blood pressure to under 160mmHg, 
and titrate nitro drip as needed to maintain this level. As stated, patient had just 
finished up hemodialysis and 1.5L of fluid was removed. Patient was on a nasal cannula 
however she keeps removing it. Saturation maintained at 94-95% on room air. Will keep 
supplemental o2 on standby is she needs it.

## 2020-06-15 NOTE — NUR
Received report from night shift nurse Michelet, patient on nitro drip with BP elevated, 
sinus rhythm on the monitor, o2 saturation WNL on room air.  Bed in low position, side rails 
upx2. Patient awake and alert x3. Bed alarm on.

## 2020-06-15 NOTE — NUR
Patient has completed dialysis, BP remains elevated, Dilaudid, Benadryl and Zofran given as 
ordered.

## 2020-06-15 NOTE — NUR
Patient refusing to take oral BP meds due to bp 130 sbp.  Contacted Oleg Carreon and informed 
him that patient refusing, stated to turn off drip until bp is >160 offer meds again and 
turn drip back on once medications are taken.

## 2020-06-15 NOTE — NUR
Explained to the patient that it is important that she take her medications on time as this 
allows for the medications to maintain a therapeutic level in her body. I explained that the 
sooner we can stabilize her blood pressure, the sooner she can leave the ICU. She verbalized 
understanding, but did not seem enthusiastic about being compliant.

## 2020-06-15 NOTE — NUR
Dr Carreon and Dr. Evans in the unit discussing change in IV drips.  Initially 
labetalol ordered, pharmacy discussed this change with Dr. Evans and they decided due 
to the heart rate that cardene is better suited for the patient

## 2020-06-15 NOTE — NUR
Contacted Dr. dalal to verify if patient is to have amlodipine BID, He verified that 
this should stay the same as it is her home medication. Contacted pharmacy to notify.

## 2020-06-15 NOTE — NUR
Patient agreed to take oral medications at this time.  Explained to patient that Physician 
said that the drip must be back on if BP reaches 170 with taking oral medication. Patient 
verbalized understanding.  Currently patient in bed, no distress noted at this time, bed in 
low position, side rails up x2.  Oxygen saturation WNL, HR WNL.

## 2020-06-15 NOTE — NUR
Received patient in bed. Patient is awake and A/O x 3. Patient has 2 large bore IVs in the 
left forearm and 1 in the right forearm. Patient is no longer on the nitroglycerine 
infusion, and has nicardipine infusion currently on hold. Plan of care is to reduce systolic 
blood pressure to under 170mmHg, and Nicardipine drip as needed to maintain this level. 
Patient had hemodialysis and 2.3L of fluid was removed. Received report that the patient has 
been non-compliant with taking her blood pressure medication.

## 2020-06-15 NOTE — NUR
Critical Hemoglobin level 6.9. Dr. Evans made aware, no new orders given as he said 
he will be in the unit shortly.

## 2020-06-15 NOTE — NUR
Dr. Weir saw patient and orders received to titrate nitro drip as able since patient 
is receiving oral medications.  patients BP continues to be elevated through dialysis.

## 2020-06-16 NOTE — NUR
patient received from night shift asleep, no distress noted at this time.  Bed in low 
position, side rails upx2.  Cardene continues to be on hold.  BP elevated to 164/109, oxygen 
saturation WNL.

## 2020-06-16 NOTE — NUR
Patient has been cooperative with care throughout shift.  Reports frequent pain in  back and 
was informed that she will be seen by Dr. Mendez for pain management and that Dilaudid has 
been discontinued.  Patient remains in sinus rhythm with improving BP's, using bedside 
commode independently.  Oxygen saturation WNL

## 2020-06-16 NOTE — NUR
MOVED PATIENT VIA WHEELCHAIR . PATIENT AAOX4,MAEX4 . NO RESPIRATORY DISTRESS ON ROOM AIR 
.AFEBRILE /73 RR 20 SATURATION 100%.ALL BELONGINGS WITH PATIENT .

## 2020-06-16 NOTE — NUR
Received patient awake and alert. Patient shows no signs or symptoms of distress at this 
time. Vital signs stable. BP- 127/80. Bed set to lowest position. Call light within reach. 
Will continue to monitor patient.

## 2020-06-16 NOTE — NUR
Pt complains of having a sudden urge of anxiety at this time. Pt reports having a bad 
reaction from taking lorazepam previously - increased agitation. Dr. Drummond called and 
notified. Received orders. Will administer and will continue to monitor patient.

## 2020-06-16 NOTE — NUR
Pt now complains of having 8/10 back pain and headache. Pt requesting to have Dilaudid PRN 
to be reordered by physician. Left message for Dr. Drummond. Awaiting response.

## 2020-06-17 NOTE — NUR
Patient shows no signs or symptoms of distress at this time. Denies having any shortness of 
breath at this time. Vital signs stable. NSR on tele monitor. 0600 Hydralazine dose held due 
to pt having dialysis today. Pt complains of having 9/10 back pain. Dr. Castro notified 
and no orders received because pt is drug seeking. Will endorse patient to day shift nurse 
in stable condition.

## 2020-06-17 NOTE — NUR
Patient  calm and comfortable throughout shift. Patient medication compliant. Patient with 
stable vital signs. Patient had dialysis prior to discharge with no complications. Patient 
discharge with belongings and valuables. Patient given discharge instructions and 
prescriptions. Patient left via private transportation.

## 2020-09-04 NOTE — NUR
Patient discharged to home in stable condition.  Written and verbal after care 
instructions given. 

Patient verbalizes understanding of instructions. Stressed follow up or return 
to ER for worsening s/s. Patient ambulated with stable gait. Patient is taking 
rideshare home.

## 2020-10-17 NOTE — NUR
Pt. admitted to telemetry  , under care of Amador Lindquist NP. Dx; Severe 
Anemia. Report given to Tasia BUTT on Telemetry

Belongs List completed room air

## 2021-10-19 NOTE — NUR
Patient was seen by MD. Lopez swab sent to lab.  Patient is ambulatory with 
steady gait.  C/O "muscle pains"

## 2021-10-19 NOTE — NUR
DC, Rx (including precautions) and follow up instructions given and explained 
to patient who states she understands all instructions

## 2024-11-21 NOTE — NUR
Received patient in bed, awake and verbally responsive and with ongoing Dialysis with HD 
Nurse at bedside. No signs of distress noted. No SOB. No complain of Pain or discomfort at 
this time. Kept the call light within easy reach. Will continue to monitor. What Type Of Note Output Would You Prefer (Optional)?: Bullet Format Is This A New Presentation, Or A Follow-Up?: Skin Lesions